# Patient Record
Sex: MALE | Race: WHITE | Employment: OTHER | URBAN - NONMETROPOLITAN AREA
[De-identification: names, ages, dates, MRNs, and addresses within clinical notes are randomized per-mention and may not be internally consistent; named-entity substitution may affect disease eponyms.]

---

## 2017-10-12 ENCOUNTER — HISTORY (OUTPATIENT)
Dept: EMERGENCY MEDICINE | Facility: OTHER | Age: 77
End: 2017-10-12

## 2017-10-13 ENCOUNTER — COMMUNICATION - GICH (OUTPATIENT)
Dept: FAMILY MEDICINE | Facility: OTHER | Age: 77
End: 2017-10-13

## 2017-10-13 ENCOUNTER — COMMUNICATION - GICH (OUTPATIENT)
Dept: OTHER | Facility: OTHER | Age: 77
End: 2017-10-13

## 2017-10-13 DIAGNOSIS — R53.1 WEAKNESS: ICD-10-CM

## 2017-12-28 NOTE — TELEPHONE ENCOUNTER
Patient Information     Patient Name MRN Sanchez Dave [id number] Male 1940      Telephone Encounter by Darlyn Ceballos at 10/13/2017 10:26 AM     Author:  Darlyn Ceballos Service:  (none) Author Type:  (none)     Filed:  10/13/2017 10:30 AM Encounter Date:  10/13/2017 Status:  Signed     :  Darlyn Ceballos            Needs orders clarified on the Klonopin   1-2 BID PRN. The hard copy says 2 tabs BID   They need to know which direction to follow.  IF it gets changed they will need a new hard copy and that would need to be faxed to Goranitdaniel Ceballos LPN ..........10/13/2017 10:29 AM

## 2017-12-28 NOTE — TELEPHONE ENCOUNTER
Patient Information     Patient Name MRN Sex Sanchez Duff [id number] Male 1940      Telephone Encounter by Jess Hickman MD at 10/13/2017 12:27 PM     Author:  Jess Hickman MD Service:  (none) Author Type:  Physician     Filed:  10/13/2017 12:28 PM Encounter Date:  10/13/2017 Status:  Signed     :  Jess Hickman MD (Physician)            I'm not familiar with this patient. It looks like previously it was given as needed. And yesterday the ED doc sent in an prescription for scheduled? Is this correct? What does NH staff think. Is keeping it as needed most appropriate?

## 2017-12-28 NOTE — TELEPHONE ENCOUNTER
Patient Information     Patient Name MRN Sanchez Dave [id number] Male 1940      Telephone Encounter by Mimi Feliz at 10/13/2017 10:16 AM     Author:  Mimi Feliz Service:  (none) Author Type:  (none)     Filed:  10/13/2017 10:17 AM Encounter Date:  10/13/2017 Status:  Signed     :  Mimi Feliz            Left message to call back.   Mimi Feliz LPN.................. 10/13/2017 10:16 AM

## 2017-12-28 NOTE — TELEPHONE ENCOUNTER
Patient Information     Patient Name MRN Sanchez Dave [id number] Male 1940      Telephone Encounter by Mimi Feliz at 10/13/2017  1:16 PM     Author:  Mimi Feliz Service:  (none) Author Type:  (none)     Filed:  10/13/2017  1:20 PM Encounter Date:  10/13/2017 Status:  Signed     :  Mimi Feliz            Called Meagan back. She states she found documentation stating that they called yesterday to have it clarified. She states it is as need. She no longer needs anything.    .sgin

## 2017-12-28 NOTE — TELEPHONE ENCOUNTER
Patient Information     Patient Name MRN Sex Sanchez Duff [id number] Male 1940      Telephone Encounter by Darlyn Ceballos at 10/13/2017 10:28 AM     Author:  Darlyn Ceballos Service:  (none) Author Type:  (none)     Filed:  10/13/2017 10:28 AM Encounter Date:  10/13/2017 Status:  Signed     :  Darlyn Ceballos            [facility] notified.   Darlyn Ceballos LPN ..........10/13/2017 10:28 AM

## 2017-12-28 NOTE — TELEPHONE ENCOUNTER
Patient Information     Patient Name MRN Sanchez Dave [id number] Male 1940      Telephone Encounter by Mimi Feliz at 10/13/2017 10:15 AM     Author:  Mimi Feliz Service:  (none) Author Type:  (none)     Filed:  10/13/2017 10:16 AM Encounter Date:  10/13/2017 Status:  Signed     :  Mimi Feliz            Left message to call back, see other phone note as well.     Mimi Feliz ....................  10/13/2017   10:16 AM

## 2017-12-28 NOTE — TELEPHONE ENCOUNTER
Patient Information     Patient Name MRN Sex Sanchez Duff [id number] Male 1940      Telephone Encounter by Bienvenido Moses MD at 10/13/2017  6:37 AM     Author:  Bienvenido Moses MD Service:  (none) Author Type:  Physician     Filed:  10/13/2017  6:38 AM Encounter Date:  10/13/2017 Status:  Signed     :  Bienvenido Moses MD (Physician)            Procalcitonin is negative. Unlikely to have bacterial infection causing elevated WBC. Other source possible. Has hx of leukocytosis [facility], which are not available. Can work with PCP on further eval for this.   Please let [facility] know.

## 2018-01-31 ENCOUNTER — DOCUMENTATION ONLY (OUTPATIENT)
Dept: FAMILY MEDICINE | Facility: OTHER | Age: 78
End: 2018-01-31

## 2018-01-31 RX ORDER — NEOMYCIN SULFATE, POLYMYXIN B SULFATE AND DEXAMETHASONE 3.5; 10000; 1 MG/ML; [USP'U]/ML; MG/ML
SUSPENSION/ DROPS OPHTHALMIC
COMMUNITY
Start: 2017-07-07 | End: 2018-11-07

## 2018-01-31 RX ORDER — CLONAZEPAM 1 MG/1
2 TABLET ORAL 2 TIMES DAILY
COMMUNITY
Start: 2017-10-12 | End: 2018-11-07

## 2018-01-31 RX ORDER — RISPERIDONE 0.25 MG/1
0.25 TABLET ORAL
COMMUNITY
Start: 2017-08-08 | End: 2018-11-07 | Stop reason: DRUGHIGH

## 2018-01-31 RX ORDER — FLUVOXAMINE MALEATE 100 MG
1 TABLET ORAL 2 TIMES DAILY
COMMUNITY
Start: 2016-08-18 | End: 2019-02-21

## 2018-01-31 RX ORDER — BUPROPION HYDROCHLORIDE 150 MG/1
1 TABLET ORAL DAILY
COMMUNITY
Start: 2017-09-15 | End: 2019-02-21

## 2018-01-31 RX ORDER — PREGABALIN 100 MG/1
1 CAPSULE ORAL 3 TIMES DAILY
COMMUNITY
Start: 2017-10-12 | End: 2018-11-07

## 2018-01-31 RX ORDER — PANTOPRAZOLE SODIUM 40 MG/1
40 TABLET, DELAYED RELEASE ORAL
COMMUNITY
Start: 2017-01-04 | End: 2019-01-01

## 2018-01-31 RX ORDER — GABAPENTIN 300 MG/1
2 CAPSULE ORAL AT BEDTIME
COMMUNITY
Start: 2017-07-05 | End: 2018-11-07

## 2018-01-31 RX ORDER — CELECOXIB 100 MG/1
1 CAPSULE ORAL
COMMUNITY
Start: 2017-09-01 | End: 2019-01-01

## 2018-11-07 ENCOUNTER — NURSING HOME VISIT (OUTPATIENT)
Dept: GERIATRICS | Facility: OTHER | Age: 78
End: 2018-11-07
Attending: NURSE PRACTITIONER
Payer: COMMERCIAL

## 2018-11-07 VITALS
RESPIRATION RATE: 16 BRPM | WEIGHT: 190.3 LBS | TEMPERATURE: 98.3 F | OXYGEN SATURATION: 94 % | HEART RATE: 66 BPM | SYSTOLIC BLOOD PRESSURE: 101 MMHG | DIASTOLIC BLOOD PRESSURE: 65 MMHG

## 2018-11-07 DIAGNOSIS — K22.719 BARRETT'S ESOPHAGUS WITH DYSPLASIA: ICD-10-CM

## 2018-11-07 DIAGNOSIS — R29.898 WEAKNESS OF BOTH LOWER EXTREMITIES: ICD-10-CM

## 2018-11-07 DIAGNOSIS — F41.1 ANXIETY, GENERALIZED: ICD-10-CM

## 2018-11-07 DIAGNOSIS — M15.9 OSTEOARTHRITIS OF MULTIPLE JOINTS, UNSPECIFIED OSTEOARTHRITIS TYPE: ICD-10-CM

## 2018-11-07 DIAGNOSIS — M47.816 ARTHROPATHY OF LUMBAR FACET JOINT: ICD-10-CM

## 2018-11-07 DIAGNOSIS — Z79.891 LONG TERM CURRENT USE OF OPIATE ANALGESIC: ICD-10-CM

## 2018-11-07 DIAGNOSIS — F42.9 OBSESSIVE-COMPULSIVE DISORDER, UNSPECIFIED TYPE: Primary | ICD-10-CM

## 2018-11-07 DIAGNOSIS — F33.41 RECURRENT MAJOR DEPRESSIVE DISORDER, IN PARTIAL REMISSION (H): ICD-10-CM

## 2018-11-07 DIAGNOSIS — J84.112 IPF (IDIOPATHIC PULMONARY FIBROSIS) (H): ICD-10-CM

## 2018-11-07 DIAGNOSIS — I71.40 ABDOMINAL AORTIC ANEURYSM (AAA) WITHOUT RUPTURE (H): ICD-10-CM

## 2018-11-07 DIAGNOSIS — M47.817 OSTEOARTHRITIS OF LUMBOSACRAL SPINE WITHOUT MYELOPATHY: ICD-10-CM

## 2018-11-07 RX ORDER — CLONAZEPAM 0.5 MG/1
0.5 TABLET ORAL 2 TIMES DAILY PRN
Qty: 20 TABLET | Refills: 0 | COMMUNITY
Start: 2018-11-07 | End: 2019-01-01

## 2018-11-07 RX ORDER — POLYETHYLENE GLYCOL 3350 17 G/17G
1 POWDER, FOR SOLUTION ORAL DAILY PRN
Qty: 510 G | Refills: 1 | COMMUNITY
Start: 2018-11-07 | End: 2019-02-21

## 2018-11-07 RX ORDER — HYDROCODONE BITARTRATE AND ACETAMINOPHEN 5; 325 MG/1; MG/1
1 TABLET ORAL AT BEDTIME
Refills: 0 | COMMUNITY
Start: 2018-11-07 | End: 2018-11-27

## 2018-11-07 RX ORDER — LACTOBACILLUS ACIDOPHILUS 500MM CELL
1 CAPSULE ORAL DAILY
COMMUNITY
Start: 2018-11-07 | End: 2019-05-03

## 2018-11-07 RX ORDER — GABAPENTIN 300 MG/1
300 CAPSULE ORAL 3 TIMES DAILY
Qty: 180 CAPSULE | Refills: 5 | COMMUNITY
Start: 2018-11-07 | End: 2019-02-21
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
## 2018-11-07 NOTE — MR AVS SNAPSHOT
After Visit Summary   11/7/2018    Sanchez Hensley    MRN: [id number]           Patient Information     Date Of Birth          1940        Visit Information        Provider Department      11/7/2018 3:30 AM Antonietta Pérez NP [facility]        Today's Diagnoses     Obsessive-compulsive disorder, unspecified type    -  1    Anxiety, generalized        Abdominal aortic aneurysm (AAA) without rupture (H)        Us's esophagus with dysplasia        Arthropathy of lumbar facet joint        Osteoarthritis of lumbosacral spine without myelopathy        Weakness of both lower extremities        Long term current use of opiate analgesic        Osteoarthritis of multiple joints, unspecified osteoarthritis type        Recurrent major depressive disorder, in partial remission (H)        IPF (idiopathic pulmonary fibrosis) (H)           Follow-ups after your visit        Who to contact     If you have questions or need follow up information about today's clinic visit or your schedule please contact [facility] directly at [phone].  Normal or non-critical lab and imaging results will be communicated to you by MyChart, letter or phone within 4 business days after the clinic has received the results. If you do not hear from us within 7 days, please contact the clinic through MyChart or phone. If you have a critical or abnormal lab result, we will notify you by phone as soon as possible.  Submit refill requests through [employer] or call your pharmacy and they will forward the refill request to us. Please allow 3 business days for your refill to be completed.          Additional Information About Your Visit        Care EveryWhere ID     This is your Care EveryWhere ID. This could be used by other organizations to access your [facility] medical records  [id number]        Your Vitals Were     Pulse Temperature Respirations Pulse Oximetry          66 98.3  F (36.8   C) 16 94%         Blood Pressure from Last 3 Encounters:   11/07/18 101/65    Weight from Last 3 Encounters:   11/07/18 190 lb 4.8 oz (86.3 kg)              Today, you had the following     No orders found for display         Today's Medication Changes          These changes are accurate as of 11/7/18  6:45 PM.  If you have any questions, ask your nurse or doctor.               These medicines have changed or have updated prescriptions.        Dose/Directions    clonazePAM 0.5 MG tablet   Commonly known as:  klonoPIN   This may have changed:    - medication strength  - how much to take  - when to take this  - reasons to take this   Used for:  Anxiety, generalized   Changed by:  Antonietta Pérez NP        Dose:  0.5 mg   Take 1 tablet (0.5 mg) by mouth 2 times daily as needed for anxiety   Quantity:  20 tablet   Refills:  0       gabapentin 300 MG capsule   Commonly known as:  NEURONTIN   This may have changed:    - how much to take  - when to take this   Changed by:  Antonietta Pérez NP        Dose:  300 mg   Take 1 capsule (300 mg) by mouth 3 times daily   Quantity:  180 capsule   Refills:  5         Stop taking these medicines if you haven't already. Please contact your care team if you have questions.     risperiDONE 0.25 MG tablet   Commonly known as:  risperDAL   Stopped by:  Antonietta Pérez NP                   Information about OPIOIDS     PRESCRIPTION OPIOIDS: WHAT YOU NEED TO KNOW   We gave you an opioid (narcotic) pain medicine. It is important to manage your pain, but opioids are not always the best choice. You should first try all the other options your care team gave you. Take this medicine for as short a time (and as few doses) as possible.    Some activities can increase your pain, such as bandage changes or therapy sessions. It may help to take your pain medicine 30 to 60 minutes before these activities. Reduce your stress by getting enough sleep, working on hobbies you enjoy and practicing relaxation or  meditation. Talk to your care team about ways to manage your pain beyond prescription opioids.    These medicines have risks:    DO NOT drive when on new or higher doses of pain medicine. These medicines can affect your alertness and reaction times, and you could be arrested for driving under the influence (DUI). If you need to use opioids long-term, talk to your care team about driving.    DO NOT operate heavy machinery    DO NOT do any other dangerous activities while taking these medicines.    DO NOT drink any alcohol while taking these medicines.     If the opioid prescribed includes acetaminophen, DO NOT take with any other medicines that contain acetaminophen. Read all labels carefully. Look for the word  acetaminophen  or  Tylenol.  Ask your pharmacist if you have questions or are unsure.    You can get addicted to pain medicines, especially if you have a history of addiction (chemical, alcohol or substance dependence). Talk to your care team about ways to reduce this risk.    All opioids tend to cause constipation. Drink plenty of water and eat foods that have a lot of fiber, such as fruits, vegetables, prune juice, apple juice and high-fiber cereal. Take a laxative (Miralax, milk of magnesia, Colace, Senna) if you don t move your bowels at least every other day. Other side effects include upset stomach, sleepiness, dizziness, throwing up, tolerance (needing more of the medicine to have the same effect), physical dependence and slowed breathing.    Store your pills in a secure place, locked if possible. We will not replace any lost or stolen medicine. If you don t finish your medicine, please throw away (dispose) as directed by your pharmacist. The [place] Pollution [employer] has more information about safe disposal: https://[email or web address].[facility].[place].us/living-green/managing-unwanted-medications         Primary Care Provider Office Phone # Fax #    Antonietta Pérez [phone]       [address]  [facility] [address]        Equal Access to Services     MYCHAL SEGOVIA : Hadii wendy Trotter, jorge bryant, pato dash. So [facility] [phone].    ATENCIÓN: Si habla español, tiene a dobbs disposición servicios gratuitos de asistencia lingüística. Llame al [phone].    We comply with applicable federal civil rights laws and [place] laws. We do not discriminate on the basis of race, color, national origin, age, disability, sex, sexual orientation, or gender identity.            Thank you!     Thank you for choosing [facility]  for your care. Our goal is always to provide you with excellent care. Hearing back from our patients is one way we can continue to improve our services. Please take a few minutes to complete the written survey that you may receive in the mail after your visit with us. Thank you!             Your Updated Medication List - Protect others around you: Learn how to safely use, store and throw away your medicines at www.disposemymeds.org.          This list is accurate as of 11/7/18  6:45 PM.  Always use your most recent med list.                   Brand Name Dispense Instructions for use Diagnosis    Acidophilus Lactobacillus Caps      Take 1 capsule by mouth daily        buPROPion 150 MG 24 hr tablet    WELLBUTRIN XL     Take 1 tablet by mouth daily Do not chew or crush        celecoxib 100 MG capsule    celeBREX     Take 1 capsule by mouth 2 times daily as needed for pain        clonazePAM 0.5 MG tablet    klonoPIN    20 tablet    Take 1 tablet (0.5 mg) by mouth 2 times daily as needed for anxiety    Anxiety, generalized       fluvoxaMINE 100 MG tablet    LUVOX     Take 1 tablet by mouth 2 times daily        gabapentin 300 MG capsule    NEURONTIN    180 capsule    Take 1 capsule (300 mg) by mouth 3 times daily        HYDROcodone-acetaminophen 5-325 MG per tablet    NORCO     Take 1 tablet by  mouth At Bedtime MAY ALSO TAKE ONE TAB EVERY 8 HOURS PRN PAIN.        MIRALAX powder   Generic drug:  polyethylene glycol     510 g    Take 17 g (1 capful) by mouth daily as needed for constipation        pantoprazole 40 MG EC tablet    PROTONIX     Take 40 mg by mouth

## 2018-11-14 ENCOUNTER — MEDICAL CORRESPONDENCE (OUTPATIENT)
Dept: LAB | Facility: OTHER | Age: 78
End: 2018-11-14

## 2018-11-14 ENCOUNTER — RESULTS ONLY (OUTPATIENT)
Dept: LAB | Age: 78
End: 2018-11-14

## 2018-11-14 ENCOUNTER — NURSING HOME VISIT (OUTPATIENT)
Dept: GERIATRICS | Facility: OTHER | Age: 78
End: 2018-11-14
Attending: NURSE PRACTITIONER
Payer: MEDICARE

## 2018-11-14 VITALS
OXYGEN SATURATION: 95 % | RESPIRATION RATE: 20 BRPM | DIASTOLIC BLOOD PRESSURE: 95 MMHG | HEART RATE: 78 BPM | SYSTOLIC BLOOD PRESSURE: 155 MMHG | TEMPERATURE: 97.7 F

## 2018-11-14 DIAGNOSIS — J84.112 IPF (IDIOPATHIC PULMONARY FIBROSIS) (H): ICD-10-CM

## 2018-11-14 DIAGNOSIS — F33.41 RECURRENT MAJOR DEPRESSIVE DISORDER, IN PARTIAL REMISSION (H): ICD-10-CM

## 2018-11-14 DIAGNOSIS — F42.9 OBSESSIVE-COMPULSIVE DISORDER, UNSPECIFIED TYPE: ICD-10-CM

## 2018-11-14 DIAGNOSIS — F41.1 ANXIETY, GENERALIZED: ICD-10-CM

## 2018-11-14 DIAGNOSIS — L84 CALLUS OF FOOT: Primary | ICD-10-CM

## 2018-11-14 LAB
ALBUMIN SERPL-MCNC: 3.5 G/DL (ref 3.5–5.7)
ALP SERPL-CCNC: 105 U/L (ref 34–104)
ALT SERPL W P-5'-P-CCNC: 15 U/L (ref 7–52)
ANION GAP SERPL CALCULATED.3IONS-SCNC: 8 MMOL/L (ref 3–14)
AST SERPL W P-5'-P-CCNC: 23 U/L (ref 13–39)
BILIRUB SERPL-MCNC: 0.6 MG/DL (ref 0.3–1)
BUN SERPL-MCNC: 26 MG/DL (ref 7–25)
CALCIUM SERPL-MCNC: 9.4 MG/DL (ref 8.6–10.3)
CHLORIDE SERPL-SCNC: 106 MMOL/L (ref 98–107)
CHOLEST SERPL-MCNC: 151 MG/DL
CO2 SERPL-SCNC: 25 MMOL/L (ref 21–31)
CREAT SERPL-MCNC: 1.47 MG/DL (ref 0.7–1.3)
ERYTHROCYTE [DISTWIDTH] IN BLOOD BY AUTOMATED COUNT: 15.5 % (ref 10–15)
GFR SERPL CREATININE-BSD FRML MDRD: 46 ML/MIN/1.7M2
GLUCOSE SERPL-MCNC: 136 MG/DL (ref 70–105)
HCT VFR BLD AUTO: 44.1 % (ref 40–53)
HDLC SERPL-MCNC: 21 MG/DL (ref 23–92)
HGB BLD-MCNC: 14.4 G/DL (ref 13.3–17.7)
LDLC SERPL CALC-MCNC: 96 MG/DL
MCH RBC QN AUTO: 31.1 PG (ref 26.5–33)
MCHC RBC AUTO-ENTMCNC: 32.7 G/DL (ref 31.5–36.5)
MCV RBC AUTO: 95 FL (ref 78–100)
NONHDLC SERPL-MCNC: 130 MG/DL
PLATELET # BLD AUTO: 674 10E9/L (ref 150–450)
POTASSIUM SERPL-SCNC: 5 MMOL/L (ref 3.5–5.1)
PROT SERPL-MCNC: 6.7 G/DL (ref 6.4–8.9)
RBC # BLD AUTO: 4.63 10E12/L (ref 4.4–5.9)
SODIUM SERPL-SCNC: 139 MMOL/L (ref 134–144)
TRIGL SERPL-MCNC: 170 MG/DL
WBC # BLD AUTO: 15.6 10E9/L (ref 4–11)

## 2018-11-14 NOTE — PROGRESS NOTES
"[facility]    Sanchez Hensley  : 1940  MRN: [id number]  Primary Care Physician: Antonietta Pérez CNP  [facility].     2018     HPI: Sanchez Hensley is a 78 year old male being seen today at [facility] for follow up of mood; right toe callus, and discussion for pulmonary referral due to pulmonary fibrosis.     Mood: history significant for anxiety, recurrent major depression, and obsessive-compulsive disorder. One week ago, his risperadal was stopped, gabapentin changed from at bedtime to TID. Continued with no change in dose of luvox, bupropion and prn klonopin. Staff note no change in mood or behaviors since risperdal discontinued.     Callus right 4th toe: he states the mole skin is helping.    Pulmonary fibrosis: as seen on chest CT on 10/12/2017. Does report dyspnea with exertion. Denies cough, no shortness of breath at rest.     He feels well today and his only complaint is his chronic back pain which he has had to live with \"for over 50 years.\" He does have prn norco but has not taken any other than a nightly dose before bed.   He denies any acute symptoms, no fever, no chills. No change in appetite, no bowel or bladder issues.     Medication reconciliation: completed.     CODE STATUS: DNR/DNI    Patient Active Problem List    Diagnosis Date Noted     IPF (idiopathic pulmonary fibrosis) (H) 2018     Priority: Medium     Leukocytosis 2016     Priority: Medium     Abdominal aortic aneurysm (AAA) without rupture (H) 2016     Priority: Medium     Long term current use of opiate analgesic 2015     Priority: Medium     Overview:   Overview:   [facility] Treatment Agreement: Controlled Substances for Pain Management 2015  [facility] Agreement for Opioid Treatment  PHYSICIAN:  Bob Alvarado MD  PHARMACY:  [facility] in [place], [place]  PHARMACY PHONE:  [phone]    7-8 injections with Dr. Abdullahi Pemberton over a " period of 3 years, with very limited effect.        Hearing loss of right ear 03/30/2015     Priority: Medium     Difficulty sleeping 10/02/2014     Priority: Medium     Weakness of lower extremity 10/02/2014     Priority: Medium     HCD (health care directive) 08/27/2014     Priority: Medium     Gastroesophageal reflux disease 01/23/2014     Priority: Medium     Anxiety, generalized 09/23/2013     Priority: Medium     Overview:   Overview:   GAD7 - 3/27/14 (3)       Major depressive disorder 09/23/2013     Priority: Medium     Overview:   Overview:   PHQ9 - 11/6/14 (0)  PHQ9 - 10/2/14 (6)  PHQ9 - 8/21/14 (2)       Arthropathy of lumbar facet joint 03/26/2013     Priority: Medium     Blood glucose abnormal 03/26/2013     Priority: Medium     Osteoarthritis of lumbosacral spine without myelopathy 03/26/2013     Priority: Medium     Overview:   Overview:   neurolysis, injections, no help. Has seen Dr. 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     Priority: Medium     Osteoarthritis 08/08/2006     Priority: Medium     Overview:   Overview:   IMO Update  IMO Update 10 2016       Current Outpatient Prescriptions   Medication Sig Dispense Refill     Acidophilus Lactobacillus CAPS Take 1 capsule by mouth daily       buPROPion (WELLBUTRIN XL) 150 MG 24 hr tablet Take 1 tablet by mouth daily Do not chew or crush       celecoxib (CELEBREX) 100 MG capsule Take 1 capsule by mouth 2 times daily as needed for pain       clonazePAM (KLONOPIN) 0.5 MG tablet Take 1 tablet (0.5 mg) by mouth 2 times daily as needed for anxiety 20 tablet 0     fluvoxaMINE (LUVOX) 100 MG tablet Take 1 tablet by mouth 2 times daily       gabapentin (NEURONTIN) 300 MG capsule Take 1  capsule (300 mg) by mouth 3 times daily 180 capsule 5     HYDROcodone-acetaminophen (NORCO) 5-325 MG per tablet Take 1 tablet by mouth At Bedtime MAY ALSO TAKE ONE TAB EVERY 8 HOURS PRN PAIN.  0     pantoprazole (PROTONIX) 40 MG EC tablet Take 40 mg by mouth       polyethylene glycol (MIRALAX) powder Take 17 g (1 capful) by mouth daily as needed for constipation 510 g 1     Allergies   Allergen Reactions     Aspirin Hives     Nasal congestion     Morphine      Other reaction(s): *Unknown  Nausea, dizzy, sick to stomach     Naproxen      Other reaction(s): *Unknown  Bothered stomach     Sulfasalazine      Other reaction(s): *Unknown       Review of systems:  Pertinent systems reviewed and found to be negative. No chest pain, no fever, no new behavior disturbances.       PHYSICAL EXAM:  Vitals per Nursing staff: Blood pressure (!) 155/95, pulse 78, temperature 97.7  F (36.5  C), resp. rate 20, SpO2 95 %.RA  GENERAL APPEARANCE: Well developed elderly, white male in no acute distress. Alert, oriented x 3, some forgetfulness.  SKIN: Warm and dry. No change to callus to right 4th distal MTP joint. Wearing slippers. No mole skin covering callus at this time.   LUNGS: Normal respirations, non labored, fine velcro crackles to bilateral bases, likely chronic due to pulmonary fibrosis. No wheezes. No cough.  HEART: Regular rhythm and rate; S1 and S2, no murmur, gallop or rub  EXTREMITIES: No bilateral lower extremity edema. No joint redness or swelling.   PSYCHIATRIC: Very pleasant, talkative, mood and affect appropriate.     Labs: drawn today with results pending: cmp, lipid, cbc with platelets       ASSESSMENT / PLAN:  (L84) Callus of foot  (primary encounter diagnosis)  Plan: continue mole skin for protection and comfort.     (J84.112) IPF (idiopathic pulmonary fibrosis) (H)  Comment: we did discuss findings on chest CT of pulmonary fibrosis and further testing for monitoring with PFT and referral to pulmonology.   Plan:  Rojas defers PFT and referral to specialty at this time and states he will consider for future.     (F41.1) Anxiety, generalized  (F33.41) Recurrent major depressive disorder, in partial remission (H)  (F42.9) Obsessive-compulsive disorder, unspecified type  Comment:   Plan: no new behaviors or mood disturbances since risperdal was discontinue one week ago.     Late entry: today's labs show leukocytosis with white blood cell count of 15.6. Exam findings not suspicious for acute infection, patient denies feeling ill, no new c/o pain. Will have lab follow up with microscopic slide review, further studies if indicated.   There are limited lab study results in [facility] EMR. He did have an elevated white blood cell count of 23.3 in 10/12/2017 and further studies and diagnostics negative for acute infection at that time.      Antonietta Pérez, CNP

## 2018-11-14 NOTE — MR AVS SNAPSHOT
After Visit Summary   11/14/2018    Sanchez Hensley    MRN: [id number]           Patient Information     Date Of Birth          1940        Visit Information        Provider Department      11/14/2018 3:00 AM Antonietta Pérez NP [facility]        Today's Diagnoses     Callus of foot    -  1    IPF (idiopathic pulmonary fibrosis) (H)        Anxiety, generalized        Recurrent major depressive disorder, in partial remission (H)        Obsessive-compulsive disorder, unspecified type           Follow-ups after your visit        Who to contact     If you have questions or need follow up information about today's clinic visit or your schedule please contact [facility] directly at [phone].  Normal or non-critical lab and imaging results will be communicated to you by MyChart, letter or phone within 4 business days after the clinic has received the results. If you do not hear from us within 7 days, please contact the clinic through MyChart or phone. If you have a critical or abnormal lab result, we will notify you by phone as soon as possible.  Submit refill requests through [facility] or call your pharmacy and they will forward the refill request to us. Please allow 3 business days for your refill to be completed.          Additional Information About Your Visit        Care EveryWhere ID     This is your Care EveryWhere ID. This could be used by other organizations to access your [facility] medical records  [id number]        Your Vitals Were     Pulse Temperature Respirations Pulse Oximetry          78 97.7  F (36.5  C) 20 95%         Blood Pressure from Last 3 Encounters:   11/14/18 (!) 155/95   11/07/18 101/65    Weight from Last 3 Encounters:   11/07/18 190 lb 4.8 oz (86.3 kg)              Today, you had the following     No orders found for display       Primary Care Provider Office Phone # Fax #    Antonietta Pérez [phone]       [id number] [facility] [id number]        Equal Access to Services     SHASTAPATRIC JULIETTE : Hadii wendy Trotter, jorge bryant, pato adsh. So [facility] [phone].    ATENCIÓN: Si habla español, tiene a dobbs disposición servicios gratuitos de asistencia lingüística. Llame al [phone].    We comply with applicable federal civil rights laws and [place] laws. We do not discriminate on the basis of race, color, national origin, age, disability, sex, sexual orientation, or gender identity.            Thank you!     Thank you for choosing [facility]  for your care. Our goal is always to provide you with excellent care. Hearing back from our patients is one way we can continue to improve our services. Please take a few minutes to complete the written survey that you may receive in the mail after your visit with us. Thank you!             Your Updated Medication List - Protect others around you: Learn how to safely use, store and throw away your medicines at www.disposemymeds.org.          This list is accurate as of 11/14/18  3:56 PM.  Always use your most recent med list.                   Brand Name Dispense Instructions for use Diagnosis    Acidophilus Lactobacillus Caps      Take 1 capsule by mouth daily        buPROPion 150 MG 24 hr tablet    WELLBUTRIN XL     Take 1 tablet by mouth daily Do not chew or crush        celecoxib 100 MG capsule    celeBREX     Take 1 capsule by mouth 2 times daily as needed for pain        clonazePAM 0.5 MG tablet    klonoPIN    20 tablet    Take 1 tablet (0.5 mg) by mouth 2 times daily as needed for anxiety    Anxiety, generalized       fluvoxaMINE 100 MG tablet    LUVOX     Take 1 tablet by mouth 2 times daily        gabapentin 300 MG capsule    NEURONTIN    180 capsule    Take 1 capsule (300 mg) by mouth 3 times daily        HYDROcodone-acetaminophen 5-325 MG per tablet    NORCO     Take 1  tablet by mouth At Bedtime MAY ALSO TAKE ONE TAB EVERY 8 HOURS PRN PAIN.        MIRALAX powder   Generic drug:  polyethylene glycol     510 g    Take 17 g (1 capful) by mouth daily as needed for constipation        pantoprazole 40 MG EC tablet    PROTONIX     Take 40 mg by mouth

## 2018-11-27 DIAGNOSIS — G89.4 CHRONIC PAIN SYNDROME: Primary | ICD-10-CM

## 2018-11-27 RX ORDER — HYDROCODONE BITARTRATE AND ACETAMINOPHEN 5; 325 MG/1; MG/1
1 TABLET ORAL AT BEDTIME
Qty: 35 TABLET | Refills: 0 | Status: SHIPPED | OUTPATIENT
Start: 2018-11-27 | End: 2018-12-27

## 2018-12-26 DIAGNOSIS — G89.4 CHRONIC PAIN SYNDROME: ICD-10-CM

## 2018-12-26 NOTE — TELEPHONE ENCOUNTER
Routing refill request to provider for review/approval because:  Drug not on the [facility] refill protocol     Last filled on 11-27-18 for #35 X 0 refills. Last nursing home visit 11-14-18.  Ashlyn Clemens RN on 12/26/2018 at 1:34 PM

## 2018-12-27 RX ORDER — HYDROCODONE BITARTRATE AND ACETAMINOPHEN 5; 325 MG/1; MG/1
1 TABLET ORAL AT BEDTIME
Qty: 35 TABLET | Refills: 0 | Status: SHIPPED | OUTPATIENT
Start: 2018-12-27 | End: 2019-01-24

## 2018-12-27 NOTE — TELEPHONE ENCOUNTER
Notified [facility] prescription is ready to be picked up at the [address] [facility].    Ashlyn Mosher LPN............12/27/2018 3:54 PM

## 2019-01-01 ENCOUNTER — TELEPHONE (OUTPATIENT)
Dept: FAMILY MEDICINE | Facility: OTHER | Age: 79
End: 2019-01-01

## 2019-01-01 ENCOUNTER — HOSPITAL ENCOUNTER (OUTPATIENT)
Dept: GENERAL RADIOLOGY | Facility: OTHER | Age: 79
Discharge: HOME | End: 2019-11-26
Attending: FAMILY MEDICINE | Admitting: FAMILY MEDICINE
Payer: MEDICARE

## 2019-01-01 ENCOUNTER — NURSING HOME VISIT (OUTPATIENT)
Dept: GERIATRICS | Facility: OTHER | Age: 79
End: 2019-01-01
Attending: NURSE PRACTITIONER
Payer: COMMERCIAL

## 2019-01-01 ENCOUNTER — MEDICAL CORRESPONDENCE (OUTPATIENT)
Dept: HEALTH INFORMATION MANAGEMENT | Facility: OTHER | Age: 79
End: 2019-01-01

## 2019-01-01 ENCOUNTER — OFFICE VISIT (OUTPATIENT)
Dept: FAMILY MEDICINE | Facility: OTHER | Age: 79
End: 2019-01-01
Attending: FAMILY MEDICINE
Payer: COMMERCIAL

## 2019-01-01 ENCOUNTER — APPOINTMENT (OUTPATIENT)
Dept: LAB | Facility: OTHER | Age: 79
End: 2019-01-01
Attending: NURSE PRACTITIONER
Payer: MEDICARE

## 2019-01-01 ENCOUNTER — RESULTS ONLY (OUTPATIENT)
Dept: LAB | Age: 79
End: 2019-01-01

## 2019-01-01 VITALS
SYSTOLIC BLOOD PRESSURE: 104 MMHG | DIASTOLIC BLOOD PRESSURE: 68 MMHG | WEIGHT: 189.4 LBS | TEMPERATURE: 97.7 F | HEIGHT: 68 IN | OXYGEN SATURATION: 97 % | HEART RATE: 90 BPM | RESPIRATION RATE: 20 BRPM | BODY MASS INDEX: 28.7 KG/M2

## 2019-01-01 VITALS
SYSTOLIC BLOOD PRESSURE: 122 MMHG | TEMPERATURE: 98.9 F | RESPIRATION RATE: 18 BRPM | DIASTOLIC BLOOD PRESSURE: 70 MMHG | OXYGEN SATURATION: 93 % | HEART RATE: 77 BPM

## 2019-01-01 DIAGNOSIS — F42.9 OBSESSIVE-COMPULSIVE DISORDER, UNSPECIFIED TYPE: ICD-10-CM

## 2019-01-01 DIAGNOSIS — F33.41 RECURRENT MAJOR DEPRESSIVE DISORDER, IN PARTIAL REMISSION (H): ICD-10-CM

## 2019-01-01 DIAGNOSIS — J84.10 PULMONARY FIBROSIS (H): ICD-10-CM

## 2019-01-01 DIAGNOSIS — K59.09 OTHER CONSTIPATION: Primary | ICD-10-CM

## 2019-01-01 DIAGNOSIS — G89.4 CHRONIC PAIN DISORDER: Primary | ICD-10-CM

## 2019-01-01 DIAGNOSIS — H61.22 IMPACTED CERUMEN OF LEFT EAR: Primary | ICD-10-CM

## 2019-01-01 DIAGNOSIS — K59.09 OTHER CONSTIPATION: ICD-10-CM

## 2019-01-01 DIAGNOSIS — F41.1 ANXIETY, GENERALIZED: ICD-10-CM

## 2019-01-01 DIAGNOSIS — K22.719 BARRETT'S ESOPHAGUS WITH DYSPLASIA: ICD-10-CM

## 2019-01-01 LAB
CREAT SERPL-MCNC: 1.27 MG/DL (ref 0.7–1.3)
GFR SERPL CREATININE-BSD FRML MDRD: 54 ML/MIN/{1.73_M2}

## 2019-01-01 PROCEDURE — G0463 HOSPITAL OUTPT CLINIC VISIT: HCPCS | Mod: 25

## 2019-01-01 PROCEDURE — 71046 X-RAY EXAM CHEST 2 VIEWS: CPT

## 2019-01-01 PROCEDURE — 99215 OFFICE O/P EST HI 40 MIN: CPT | Performed by: FAMILY MEDICINE

## 2019-01-01 PROCEDURE — G0463 HOSPITAL OUTPT CLINIC VISIT: HCPCS

## 2019-01-01 RX ORDER — CELECOXIB 100 MG/1
100 CAPSULE ORAL
Qty: 90 CAPSULE | Refills: 3 | Status: SHIPPED | OUTPATIENT
Start: 2019-01-01 | End: 2020-01-01

## 2019-01-01 RX ORDER — AMOXICILLIN 250 MG
1 CAPSULE ORAL DAILY
OUTPATIENT
Start: 2019-01-01

## 2019-01-01 RX ORDER — BISACODYL 5 MG/1
5 TABLET, DELAYED RELEASE ORAL DAILY PRN
Qty: 30 TABLET | Refills: 1 | Status: SHIPPED | OUTPATIENT
Start: 2019-01-01 | End: 2019-01-01

## 2019-01-01 RX ORDER — BISACODYL 5 MG/1
5 TABLET, DELAYED RELEASE ORAL DAILY PRN
Qty: 90 TABLET | Refills: 1 | Status: SHIPPED | OUTPATIENT
Start: 2019-01-01

## 2019-01-01 RX ORDER — DULOXETIN HYDROCHLORIDE 60 MG/1
120 CAPSULE, DELAYED RELEASE ORAL DAILY
Qty: 180 CAPSULE | Refills: 3 | Status: SHIPPED | OUTPATIENT
Start: 2019-01-01 | End: 2020-01-01

## 2019-01-01 RX ORDER — SENNA AND DOCUSATE SODIUM 50; 8.6 MG/1; MG/1
2 TABLET, FILM COATED ORAL AT BEDTIME
Qty: 60 TABLET | Refills: 0 | Status: SHIPPED | OUTPATIENT
Start: 2019-01-01 | End: 2019-01-01 | Stop reason: ALTCHOICE

## 2019-01-01 RX ORDER — PREGABALIN 150 MG/1
150 CAPSULE ORAL 2 TIMES DAILY
Qty: 180 CAPSULE | Refills: 1 | Status: SHIPPED | OUTPATIENT
Start: 2019-01-01 | End: 2020-01-01

## 2019-01-01 RX ORDER — PREGABALIN 150 MG/1
150 CAPSULE ORAL 2 TIMES DAILY
COMMUNITY
Start: 2019-01-01 | End: 2019-01-01

## 2019-01-01 RX ORDER — CLONAZEPAM 0.5 MG/1
0.5 TABLET ORAL 2 TIMES DAILY PRN
Qty: 20 TABLET | Refills: 0 | Status: SHIPPED | OUTPATIENT
Start: 2019-01-01

## 2019-01-01 RX ORDER — PANTOPRAZOLE SODIUM 40 MG/1
40 TABLET, DELAYED RELEASE ORAL DAILY
Qty: 90 TABLET | Refills: 3 | Status: SHIPPED | OUTPATIENT
Start: 2019-01-01

## 2019-01-01 ASSESSMENT — PAIN SCALES - GENERAL: PAINLEVEL: SEVERE PAIN (7)

## 2019-01-01 ASSESSMENT — PATIENT HEALTH QUESTIONNAIRE - PHQ9: SUM OF ALL RESPONSES TO PHQ QUESTIONS 1-9: 5

## 2019-01-01 ASSESSMENT — MIFFLIN-ST. JEOR: SCORE: 1537.18

## 2019-01-16 ENCOUNTER — NURSING HOME VISIT (OUTPATIENT)
Dept: GERIATRICS | Facility: OTHER | Age: 79
End: 2019-01-16
Attending: NURSE PRACTITIONER
Payer: COMMERCIAL

## 2019-01-16 VITALS
TEMPERATURE: 98.5 F | SYSTOLIC BLOOD PRESSURE: 152 MMHG | DIASTOLIC BLOOD PRESSURE: 83 MMHG | RESPIRATION RATE: 18 BRPM | OXYGEN SATURATION: 97 % | HEART RATE: 67 BPM

## 2019-01-16 DIAGNOSIS — R42 DIZZINESS: Primary | ICD-10-CM

## 2019-01-16 DIAGNOSIS — Z91.81 HISTORY OF RECENT FALL: ICD-10-CM

## 2019-01-16 NOTE — PROGRESS NOTES
"[facility] CARE    Sanchez Hensley  : 1940  MRN: [id number]  Primary Care Physician: Antonietta Pérez [facility].     2019    HPI: Sanchez Hensley is a 78 year old male being seen today at [facility] for c/o dizziness. He did have a fall last week after losing his balance and fell to his knees, no injury. He does not recall if he was dizzy causing him to lose his balance.      Rojas appears his usual today, he is sitting in his recliner. He states he feels \"fine\" and denies any dizziness at present. He does report experiencing transient dizziness for several years. He does not feel it is getting worse. He denies associated symptoms of lightheadedness, tinnitus, vision changes, weakness or near syncope. He denies vertigo.      Medication reconciliation: completed.     CODE STATUS: DNR/DNI    Patient Active Problem List    Diagnosis Date Noted     IPF (idiopathic pulmonary fibrosis) (H) 2018     Priority: Medium     Leukocytosis 2016     Priority: Medium     Abdominal aortic aneurysm (AAA) without rupture (H) 2016     Priority: Medium     Long term current use of opiate analgesic 2015     Priority: Medium     Overview:   Overview:   [facility] Treatment Agreement: Controlled Substances for Pain Management 2015  [facility] Agreement for Opioid Treatment  PHYSICIAN:  Bob Alvarado MD  PHARMACY:  [facility] in [place], [place]  PHARMACY PHONE:  [phone]    7-8 injections with Dr. Abdullahi Pemberton over a period of 3 years, with very limited effect.        Hearing loss of right ear 2015     Priority: Medium     Difficulty sleeping 10/02/2014     Priority: Medium     Weakness of lower extremity 10/02/2014     Priority: Medium     HCD (health care directive) 2014     Priority: Medium     Gastroesophageal reflux disease 2014     Priority: Medium     Anxiety, generalized 2013     Priority: Medium     Overview: "   Overview:   GAD7 - 3/27/14 (3)       Major depressive disorder 09/23/2013     Priority: Medium     Overview:   Overview:   PHQ9 - 11/6/14 (0)  PHQ9 - 10/2/14 (6)  PHQ9 - 8/21/14 (2)       Arthropathy of lumbar facet joint 03/26/2013     Priority: Medium     Blood glucose abnormal 03/26/2013     Priority: Medium     Osteoarthritis of lumbosacral spine without myelopathy 03/26/2013     Priority: Medium     Overview:   Overview:   neurolysis, injections, no help. Has seen Dr. 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  Social History     Socioeconomic History     Marital status:      Spouse name: Not on file     Number of children: Not on file     Years of education: Not on file     Highest education level: Not on file   Social Needs     Financial resource strain: Not on file     Food insecurity - worry: Not on file     Food insecurity - inability: Not on file     Transportation needs - medical: Not on file     Transportation needs - non-medical: Not on file   Occupational History     Not on file   Tobacco Use     Smoking status: Never Smoker     Smokeless tobacco: Never Used   Substance and Sexual Activity     Alcohol use: No     Drug use: Unknown     Types: Other     Comment: Drug use: No     Sexual activity: No   Other Topics Concern     Not on file   Social History Narrative     Not on file     No family history on file.  Current Outpatient Medications   Medication Sig Dispense Refill     Acidophilus Lactobacillus CAPS Take 1 capsule by mouth daily       buPROPion  "(WELLBUTRIN XL) 150 MG 24 hr tablet Take 1 tablet by mouth daily Do not chew or crush       celecoxib (CELEBREX) 100 MG capsule Take 1 capsule by mouth 2 times daily as needed for pain       clonazePAM (KLONOPIN) 0.5 MG tablet Take 1 tablet (0.5 mg) by mouth 2 times daily as needed for anxiety 20 tablet 0     fluvoxaMINE (LUVOX) 100 MG tablet Take 1 tablet by mouth 2 times daily       gabapentin (NEURONTIN) 300 MG capsule Take 1 capsule (300 mg) by mouth 3 times daily 180 capsule 5     HYDROcodone-acetaminophen (NORCO) 5-325 MG tablet Take 1 tablet by mouth At Bedtime MAY ALSO TAKE ONE TAB EVERY 8 HOURS PRN PAIN. 35 tablet 0     pantoprazole (PROTONIX) 40 MG EC tablet Take 40 mg by mouth       polyethylene glycol (MIRALAX) powder Take 17 g (1 capful) by mouth daily as needed for constipation 510 g 1     Allergies   Allergen Reactions     Aspirin Hives     Nasal congestion     Morphine      Other reaction(s): *Unknown  Nausea, dizzy, sick to stomach     Naproxen      Other reaction(s): *Unknown  Bothered stomach     Sulfasalazine      Other reaction(s): *Unknown       Review of systems:  Constitutional: feels \"fine,\" no recent fever, night sweats. Sleeping well. No new pains.       PHYSICAL EXAM:  Vitals per Nursing staff: Blood pressure 152/83, pulse 67, temperature 98.5  F (36.9  C), resp. rate 18, SpO2 97 %.RA  GENERAL APPEARANCE: Well developed elderly, white male in no acute distress. Alert, oriented x 3.  SKIN: Warm and dry. There is no diaphoresis, cyanosis, rashes, or lesions.   HEENT: Head normocephalic, atraumatic. Pupils equal and reactive. Eyes without redness, drainage, or discharge. Nose without drainage, lips and mouth moist. Neck is supple.  LUNGS: Normal respirations, fine velcro rales bilateral bases  HEART: Regular rhythm and rate; S1 and S2, no murmur, gallop or rub  ABDOMEN: Bowel sounds normal; Abdomen soft, no tenderness or guarding  NEUROLOGIC: Alert. No focal neuro deficits.    EXTREMITIES: " No bilateral lower extremity edema.   PSYCHIATRIC: Mood and affect congruent, pleasant      ASSESSMENT:  1. Dizziness    2. History of recent fall      PLAN:  Patient is advised to press his pendent and wait for staff assistance before transferring/walking.   He is not on any bp medications.   [facility]     Antonietta Pérez, CNP

## 2019-01-24 DIAGNOSIS — G89.4 CHRONIC PAIN SYNDROME: ICD-10-CM

## 2019-01-24 RX ORDER — HYDROCODONE BITARTRATE AND ACETAMINOPHEN 5; 325 MG/1; MG/1
1 TABLET ORAL AT BEDTIME
Qty: 35 TABLET | Refills: 0 | Status: SHIPPED | OUTPATIENT
Start: 2019-01-24 | End: 2019-03-27

## 2019-02-06 ENCOUNTER — NURSING HOME VISIT (OUTPATIENT)
Dept: GERIATRICS | Facility: OTHER | Age: 79
End: 2019-02-06
Attending: NURSE PRACTITIONER
Payer: COMMERCIAL

## 2019-02-06 VITALS
RESPIRATION RATE: 16 BRPM | HEART RATE: 74 BPM | SYSTOLIC BLOOD PRESSURE: 115 MMHG | DIASTOLIC BLOOD PRESSURE: 51 MMHG | HEIGHT: 70 IN | WEIGHT: 195 LBS | TEMPERATURE: 98 F | BODY MASS INDEX: 27.92 KG/M2 | OXYGEN SATURATION: 94 %

## 2019-02-06 DIAGNOSIS — R42 DIZZINESS: Primary | ICD-10-CM

## 2019-02-06 DIAGNOSIS — Z91.81 H/O FALL: ICD-10-CM

## 2019-02-06 DIAGNOSIS — R29.898 WEAKNESS OF BOTH LOWER EXTREMITIES: ICD-10-CM

## 2019-02-06 ASSESSMENT — MIFFLIN-ST. JEOR: SCORE: 1595.76

## 2019-02-10 PROBLEM — Z91.81 H/O FALL: Status: ACTIVE | Noted: 2019-02-10

## 2019-02-10 NOTE — PROGRESS NOTES
"[facility] - [facility] [place] [place] CARE    Sanchez Hensley  : 1940  MRN: [id number]  Primary Care Physician: Antonietta Pérez [facility].     2019     HPI: Sanchez Hensley is a 78 year old male being seen today at [facility] for several recent falls. Rojas has had episodes of dizziness in the past, today he describes these episodes as \"my head is spinning.\" He denies fainting and has had no LOC with any of his falls.   Baseline function is use of 4 wheeled walker for ambulation.     Rojas is quite hard of hearing despite use of bilateral hearing aids. His mentation seems clear however he is a limited historian.   He does see a provider through the [facility] and had a [facility] appointment in mid January. He states \"they told me I might have blood cancer.\" He returned from the VA appointment with no new orders per [facility] nurse.   He does have known leukocytosis.   He also states his family is working with the [facility] to get him moved to [facility] which does have a VA contract. Currently [facility] has no VA openings.     Rojas has no acute concerns at this times.     Medication reconciliation: completed.     CODE STATUS: DNR/DNI    Patient Active Problem List    Diagnosis Date Noted     H/O fall 02/10/2019     Priority: Medium     IPF (idiopathic pulmonary fibrosis) (H) 2018     Priority: Medium     Leukocytosis 2016     Priority: Medium     Abdominal aortic aneurysm (AAA) without rupture (H) 2016     Priority: Medium     Long term current use of opiate analgesic 2015     Priority: Medium     Overview:   Overview:   [facility] Treatment Agreement: Controlled Substances for Pain Management 2015  [facility] Agreement for Opioid Treatment  PHYSICIAN:  Bob Alvarado MD  PHARMACY:  [facility] in [place], [place]  PHARMACY PHONE:  [phone]    7-8 injections with Dr. Abdullahi Pemberton over a period of 3 years, with very limited effect.        Hearing loss of " right ear 03/30/2015     Priority: Medium     Difficulty sleeping 10/02/2014     Priority: Medium     Weakness of lower extremity 10/02/2014     Priority: Medium     HCD (health care directive) 08/27/2014     Priority: Medium     Gastroesophageal reflux disease 01/23/2014     Priority: Medium     Anxiety, generalized 09/23/2013     Priority: Medium     Overview:   Overview:   GAD7 - 3/27/14 (3)       Major depressive disorder 09/23/2013     Priority: Medium     Overview:   Overview:   PHQ9 - 11/6/14 (0)  PHQ9 - 10/2/14 (6)  PHQ9 - 8/21/14 (2)       Arthropathy of lumbar facet joint 03/26/2013     Priority: Medium     Blood glucose abnormal 03/26/2013     Priority: Medium     Osteoarthritis of lumbosacral spine without myelopathy 03/26/2013     Priority: Medium     Overview:   Overview:   neurolysis, injections, no help. Has seen Dr. 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  Social History     Socioeconomic History     Marital status:      Spouse name: Not on file     Number of children: Not on file     Years of education: Not on file     Highest education level: Not on file   Social Needs     Financial resource strain: Not on file     Food insecurity - worry: Not on file     Food insecurity - inability: Not on file     Transportation needs - medical: Not on file     Transportation needs - non-medical: Not on file   Occupational History     Not on file   Tobacco Use     Smoking status: Never Smoker     Smokeless tobacco: Never Used   Substance and Sexual Activity     Alcohol  "use: No     Drug use: Unknown     Types: Other     Comment: Drug use: No     Sexual activity: No   Other Topics Concern     Not on file   Social History Narrative     Not on file     No family history on file.  Current Outpatient Medications   Medication Sig Dispense Refill     Acidophilus Lactobacillus CAPS Take 1 capsule by mouth daily       buPROPion (WELLBUTRIN XL) 150 MG 24 hr tablet Take 1 tablet by mouth daily Do not chew or crush       celecoxib (CELEBREX) 100 MG capsule Take 1 capsule by mouth 2 times daily       clonazePAM (KLONOPIN) 0.5 MG tablet Take 1 tablet (0.5 mg) by mouth 2 times daily as needed for anxiety 20 tablet 0     fluvoxaMINE (LUVOX) 100 MG tablet Take 1 tablet by mouth 2 times daily       gabapentin (NEURONTIN) 300 MG capsule Take 1 capsule (300 mg) by mouth 3 times daily 180 capsule 5     HYDROcodone-acetaminophen (NORCO) 5-325 MG tablet Take 1 tablet by mouth At Bedtime MAY ALSO TAKE ONE TAB EVERY 8 HOURS PRN PAIN. 35 tablet 0     pantoprazole (PROTONIX) 40 MG EC tablet Take 40 mg by mouth       polyethylene glycol (MIRALAX) powder Take 17 g (1 capful) by mouth daily as needed for constipation 510 g 1     Allergies   Allergen Reactions     Aspirin Hives     Nasal congestion     Morphine      Other reaction(s): *Unknown  Nausea, dizzy, sick to stomach     Naproxen      Other reaction(s): *Unknown  Bothered stomach     Sulfasalazine      Other reaction(s): *Unknown       Review of systems:  Constitutional: feels \"fine,\" no recent fever, night sweats. Sleeping well. Chronic pain issues, no new or acute pain. Very [place].       PHYSICAL EXAM:  Vitals per Nursing staff: Blood pressure 115/51, pulse 74, temperature 98  F (36.7  C), resp. rate 16, height 1.778 m (5' 10\"), weight 88.5 kg (195 lb), SpO2 94 %.RA  GENERAL APPEARANCE: Well developed elderly, white male in no acute distress. Alert, oriented x 3, forgetful.  SKIN: Warm and dry. There is no diaphoresis, cyanosis, rashes, or lesions. "   HEENT: Head normocephalic, atraumatic. Eyes without redness, drainage, or discharge. Nose without drainage, lips and mouth moist. Neck is supple, no bruits.   LUNGS: Normal respirations, fine velcro rales in bilateral bases.   HEART: Regular rhythm and rate; S1 and S2, no murmur, gallop or rub  ABDOMEN: Bowel sounds normal; Abdomen soft, no tenderness or guarding  NEUROLOGIC: Alert. No focal neuro deficits.    EXTREMITIES: No bilateral lower extremity edema.   PSYCHIATRIC: Mood and affect congruent, pleasant      ASSESSMENT:  1. Dizziness    2. Weakness of both lower extremities    3. H/O fall      PLAN:  Will have PT eval and treat for dizziness - possible BPPV. He will need in-home therapy as he is unable to leave home without the assistance of another person due to weakness, balance disturbances, and falls. When he does leave home, it is for medical appointments only. This is a Face to Face for in-home services.    [facility] who is [facility] him [facility] for his leukocytosis. His VA care team includes a  pharmacist for his psychiatric medication management.    Rojas coffey give [facility] verbal consent for me to speak with VA staff on his behalf. I did speak with Maria Alejandra [place] [facility] nurse supervisor, who reports there are new medication orders that were not released to patient or AL facility; she will fax [facility] new med orders.     Antonietta Pérez, CNP

## 2019-02-20 ENCOUNTER — NURSING HOME VISIT (OUTPATIENT)
Dept: GERIATRICS | Facility: OTHER | Age: 79
End: 2019-02-20
Attending: NURSE PRACTITIONER
Payer: COMMERCIAL

## 2019-02-20 VITALS
HEART RATE: 79 BPM | RESPIRATION RATE: 16 BRPM | OXYGEN SATURATION: 95 % | TEMPERATURE: 96.7 F | DIASTOLIC BLOOD PRESSURE: 69 MMHG | SYSTOLIC BLOOD PRESSURE: 125 MMHG

## 2019-02-20 DIAGNOSIS — F33.41 RECURRENT MAJOR DEPRESSIVE DISORDER, IN PARTIAL REMISSION (H): ICD-10-CM

## 2019-02-20 DIAGNOSIS — G89.4 CHRONIC PAIN SYNDROME: Primary | ICD-10-CM

## 2019-02-20 DIAGNOSIS — F42.9 OBSESSIVE-COMPULSIVE DISORDER, UNSPECIFIED TYPE: ICD-10-CM

## 2019-02-20 DIAGNOSIS — F41.1 ANXIETY, GENERALIZED: ICD-10-CM

## 2019-02-20 NOTE — PROGRESS NOTES
"[facility]    Sanchez Hensley  : 1940  MRN: [id number]  Primary Care Physician: Antonietta Pérez [facility].     2019     HPI: Sanchez Hensley is a 78 year old male being seen today at [facility] for follow up of numerous medication changes per his VA provider.   His VA provider did stop his gabapentin, luvox, and wellbutrin and started him on pregabalin and cymbalta.     Today, Rojas appears his usual, sitting in his recliner. His mood is pleasant, he is joking. He feels his mood is his usual, he does feel his pain may be somewhat better with the recent medication changes. Staff do report he has not used any prn lorazepam since early January and has not taken any prn norco.     Rojas states he was told at his last VA appointment in January of possible \"blood cancer\" and has a meeting at the [facility] later this week. His son will be going with him. He does have chronic leukocytosis.   He has no new acute complaints at this time.     Medication reconciliation: completed.     CODE STATUS: DNR/DNI    Patient Active Problem List    Diagnosis Date Noted     H/O fall 02/10/2019     Priority: Medium     IPF (idiopathic pulmonary fibrosis) (H) 2018     Priority: Medium     Leukocytosis 2016     Priority: Medium     Abdominal aortic aneurysm (AAA) without rupture (H) 2016     Priority: Medium     Long term current use of opiate analgesic 2015     Priority: Medium     Overview:   Overview:   [facility] Treatment Agreement: Controlled Substances for Pain Management 2015  [facility] Agreement for Opioid Treatment  PHYSICIAN:  Bob Alvarado MD  PHARMACY:  [facility] in [place], [place]  PHARMACY PHONE:  [phone]    7-8 injections with Dr. Abdullahi Pemberton over a period of 3 years, with very limited effect.        Hearing loss of right ear 2015     Priority: Medium     Difficulty sleeping 10/02/2014     Priority: Medium     Weakness " of lower extremity 10/02/2014     Priority: Medium     HCD (health care directive) 08/27/2014     Priority: Medium     Gastroesophageal reflux disease 01/23/2014     Priority: Medium     Anxiety, generalized 09/23/2013     Priority: Medium     Overview:   Overview:   GAD7 - 3/27/14 (3)       Major depressive disorder 09/23/2013     Priority: Medium     Overview:   Overview:   PHQ9 - 11/6/14 (0)  PHQ9 - 10/2/14 (6)  PHQ9 - 8/21/14 (2)       Arthropathy of lumbar facet joint 03/26/2013     Priority: Medium     Blood glucose abnormal 03/26/2013     Priority: Medium     Osteoarthritis of lumbosacral spine without myelopathy 03/26/2013     Priority: Medium     Overview:   Overview:   neurolysis, injections, no help. Has seen Dr. 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  Social History     Socioeconomic History     Marital status:      Spouse name: Not on file     Number of children: Not on file     Years of education: Not on file     Highest education level: Not on file   Occupational History     Not on file   Social Needs     Financial resource strain: Not on file     Food insecurity:     Worry: Not on file     Inability: Not on file     Transportation needs:     Medical: Not on file     Non-medical: Not on file   Tobacco Use     Smoking status: Never Smoker     Smokeless tobacco: Never Used   Substance and Sexual Activity     Alcohol use: No     Drug use: Unknown     Types: Other     Comment: Drug use: No     Sexual activity: No   Lifestyle     Physical activity:     Days per  "week: Not on file     Minutes per session: Not on file     Stress: Not on file   Relationships     Social connections:     Talks on phone: Not on file     Gets together: Not on file     Attends Tenriism service: Not on file     Active member of club or organization: Not on file     Attends meetings of clubs or organizations: Not on file     Relationship status: Not on file     Intimate partner violence:     Fear of current or ex partner: Not on file     Emotionally abused: Not on file     Physically abused: Not on file     Forced sexual activity: Not on file   Other Topics Concern     Not on file   Social History Narrative     Not on file     No family history on file.  Current Outpatient Medications   Medication Sig Dispense Refill     Acidophilus Lactobacillus CAPS Take 1 capsule by mouth daily       celecoxib (CELEBREX) 100 MG capsule Take 1 capsule by mouth 2 times daily       clonazePAM (KLONOPIN) 0.5 MG tablet Take 1 tablet (0.5 mg) by mouth 2 times daily as needed for anxiety 20 tablet 0     DULoxetine (CYMBALTA) 60 MG capsule Take 1 capsule (60 mg) by mouth daily 90 capsule 3     HYDROcodone-acetaminophen (NORCO) 5-325 MG tablet Take 1 tablet by mouth At Bedtime MAY ALSO TAKE ONE TAB EVERY 8 HOURS PRN PAIN. 35 tablet 0     pantoprazole (PROTONIX) 40 MG EC tablet Take 40 mg by mouth       pregabalin (LYRICA) 75 MG capsule TAKE 1 CAP BID THRU 2/24; ON 2/25 TAKE 1 CAP PO IN A.M X 7 DAYS AND 2 CAPS AT HS X 7 DAYS; ON 3/5, TAKE 2 CAPS PO  capsule 0     Allergies   Allergen Reactions     Aspirin Hives     Nasal congestion     Morphine      Other reaction(s): *Unknown  Nausea, dizzy, sick to stomach     Naproxen      Other reaction(s): *Unknown  Bothered stomach     Sulfasalazine      Other reaction(s): *Unknown       Review of systems:  Constitutional: feels \"fine,\" no recent fever, night sweats. Sleeping well. Chronic pain issues to back and legs, no new or acute pain. Feels the pain is somewhat better. " "Very [place].       PHYSICAL EXAM:  Vitals per Nursing staff: Blood pressure 125/69, pulse 79, temperature 96.7  F (35.9  C), resp. rate 16, SpO2 95 %.RA  GENERAL APPEARANCE: Well developed elderly, white male in no acute distress. Alert, oriented x 3, forgetful.  SKIN: Warm and dry. There is no diaphoresis, cyanosis, rashes, or lesions.   HEENT: Head normocephalic, atraumatic. Eyes without redness, drainage, or discharge. Nose without drainage, lips and mouth moist. Neck is supple, no bruits.   LUNGS: Normal respirations, fine velcro rales in bilateral bases.   HEART: Regular rhythm and rate; S1 and S2, no murmur, gallop or rub  ABDOMEN: Bowel sounds normal; Abdomen soft, no tenderness or guarding  NEUROLOGIC: Alert. He does have pain and numbness to his LE's.   EXTREMITIES: No bilateral lower extremity edema.   PSYCHIATRIC: Mood and affect congruent, pleasant      ASSESSMENT:  1. Chronic pain syndrome    2. Anxiety, generalized    3. Recurrent major depressive disorder, in partial remission (H)    4. Obsessive-compulsive disorder, unspecified type      PLAN:  Patient mood and affect have been stable with no emergence of symptoms or behavior disturbances.   Patient does believe there has been some improvement in his baseline pain since recent medication changes.  He [facility] this [facility] for \"blood cancer.\"     Antonietta Pérez CNP    "

## 2019-02-21 RX ORDER — DULOXETIN HYDROCHLORIDE 60 MG/1
60 CAPSULE, DELAYED RELEASE ORAL DAILY
Qty: 90 CAPSULE | Refills: 3 | COMMUNITY
Start: 2019-02-21 | End: 2019-05-03

## 2019-02-21 RX ORDER — PREGABALIN 75 MG/1
2 CAPSULE ORAL 2 TIMES DAILY
Qty: 734 CAPSULE | Refills: 0 | COMMUNITY
Start: 2019-02-21 | End: 2019-01-01

## 2019-03-04 DIAGNOSIS — G89.4 CHRONIC PAIN SYNDROME: ICD-10-CM

## 2019-03-04 RX ORDER — HYDROCODONE BITARTRATE AND ACETAMINOPHEN 5; 325 MG/1; MG/1
1 TABLET ORAL AT BEDTIME
Qty: 35 TABLET | OUTPATIENT
Start: 2019-03-04

## 2019-03-04 NOTE — TELEPHONE ENCOUNTER
INJECTION SCHEDULED ON: Wednesday 6/14/17 at 1:00 pm.    Arrive at [facility] in [place] at 12:15 pm.    Nothing to eat or drink 6 hours prior to injection (after 7:00 am). Eat breakfast prior to 7:00 am.    Have a  to take you home after injection.    No Aspirin or Aspirin products 5 days prior to the injection    Follow-up in the clinic 2 weeks after the injection.         [facility] #[id number] sent Rx request for the following:      HYDROcodone-acetaminophen (NORCO) 5-325 MG tablet  Sig: Take 1 tablet by mouth At Bedtime MAY ALSO TAKE ONE TAB EVERY 8 HOURS PRN PAIN.  Last Prescription Date:   1/27/19  Last Fill Qty/Refills:         35, R-0    Last Office Visit:              2/20/19 ([facility] Visit)  Future Office visit:           None.    Routing refill request to provider for review/approval because:  Drug not on the [facility], [facility] or [facility] refill protocol or controlled substance    Unable to complete prescription refill per RN Medication Refill Policy. Ashlyn Elliott RN .............. 3/4/2019  11:04 AM

## 2019-03-06 ENCOUNTER — NURSING HOME VISIT (OUTPATIENT)
Dept: GERIATRICS | Facility: OTHER | Age: 79
End: 2019-03-06
Attending: NURSE PRACTITIONER
Payer: COMMERCIAL

## 2019-03-06 DIAGNOSIS — D72.828 OTHER ELEVATED WHITE BLOOD CELL (WBC) COUNT: Primary | ICD-10-CM

## 2019-03-06 DIAGNOSIS — Z71.89 ADVANCE CARE PLANNING: ICD-10-CM

## 2019-03-09 VITALS
DIASTOLIC BLOOD PRESSURE: 82 MMHG | SYSTOLIC BLOOD PRESSURE: 128 MMHG | RESPIRATION RATE: 16 BRPM | OXYGEN SATURATION: 92 % | HEART RATE: 80 BPM | TEMPERATURE: 97.2 F

## 2019-03-09 PROBLEM — Z71.89 ADVANCE CARE PLANNING: Status: ACTIVE | Noted: 2019-03-09
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
## 2019-03-27 DIAGNOSIS — G89.4 CHRONIC PAIN SYNDROME: ICD-10-CM

## 2019-03-27 RX ORDER — HYDROCODONE BITARTRATE AND ACETAMINOPHEN 5; 325 MG/1; MG/1
1 TABLET ORAL AT BEDTIME
Qty: 30 TABLET | Refills: 0
Start: 2019-03-27 | End: 2019-04-17

## 2019-04-17 DIAGNOSIS — G89.4 CHRONIC PAIN SYNDROME: ICD-10-CM

## 2019-04-17 RX ORDER — HYDROCODONE BITARTRATE AND ACETAMINOPHEN 5; 325 MG/1; MG/1
1 TABLET ORAL AT BEDTIME
Qty: 30 TABLET | Refills: 0
Start: 2019-04-17 | End: 2019-01-01

## 2019-04-30 ENCOUNTER — DOCUMENTATION ONLY (OUTPATIENT)
Dept: OTHER | Facility: CLINIC | Age: 79
End: 2019-04-30

## 2019-04-30 PROBLEM — Z71.89 ADVANCE CARE PLANNING: Status: RESOLVED | Noted: 2019-03-09 | Resolved: 2019-04-30

## 2019-05-03 ENCOUNTER — OFFICE VISIT (OUTPATIENT)
Dept: FAMILY MEDICINE | Facility: OTHER | Age: 79
End: 2019-05-03
Attending: PHYSICIAN ASSISTANT
Payer: COMMERCIAL

## 2019-05-03 VITALS
HEART RATE: 84 BPM | DIASTOLIC BLOOD PRESSURE: 78 MMHG | HEIGHT: 70 IN | RESPIRATION RATE: 20 BRPM | SYSTOLIC BLOOD PRESSURE: 102 MMHG | BODY MASS INDEX: 27.63 KG/M2 | WEIGHT: 193 LBS

## 2019-05-03 DIAGNOSIS — F41.1 ANXIETY, GENERALIZED: ICD-10-CM

## 2019-05-03 DIAGNOSIS — H61.23 BILATERAL IMPACTED CERUMEN: ICD-10-CM

## 2019-05-03 DIAGNOSIS — D72.828 OTHER ELEVATED WHITE BLOOD CELL (WBC) COUNT: Primary | ICD-10-CM

## 2019-05-03 DIAGNOSIS — F33.41 RECURRENT MAJOR DEPRESSIVE DISORDER, IN PARTIAL REMISSION (H): ICD-10-CM

## 2019-05-03 PROCEDURE — 99214 OFFICE O/P EST MOD 30 MIN: CPT | Performed by: PHYSICIAN ASSISTANT

## 2019-05-03 PROCEDURE — G0463 HOSPITAL OUTPT CLINIC VISIT: HCPCS

## 2019-05-03 PROCEDURE — 69209 REMOVE IMPACTED EAR WAX UNI: CPT | Performed by: PHYSICIAN ASSISTANT

## 2019-05-03 RX ORDER — DULOXETIN HYDROCHLORIDE 60 MG/1
120 CAPSULE, DELAYED RELEASE ORAL DAILY
COMMUNITY
Start: 2019-05-03 | End: 2019-06-25

## 2019-05-03 ASSESSMENT — MIFFLIN-ST. JEOR: SCORE: 1586.69

## 2019-05-03 ASSESSMENT — PATIENT HEALTH QUESTIONNAIRE - PHQ9: SUM OF ALL RESPONSES TO PHQ QUESTIONS 1-9: 0
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
## 2019-05-03 NOTE — PATIENT INSTRUCTIONS
Completed medication and history evaluation.     Flushed out cerumen from ears on both sides.   No concerns were appreciated.     Primary care provider - Dr. Ton Fonseca

## 2019-05-03 NOTE — NURSING NOTE
Chief Complaint   Patient presents with     Medication Therapy Management     H and P for [facility]         Medication Reconciliation: complete    Tammy Madrid, LPN

## 2019-06-11 ENCOUNTER — HOSPITAL ENCOUNTER (EMERGENCY)
Facility: OTHER | Age: 79
Discharge: NURSING, REHAB OR LONG-TERM CARE | End: 2019-06-11
Attending: EMERGENCY MEDICINE | Admitting: EMERGENCY MEDICINE
Payer: MEDICARE

## 2019-06-11 ENCOUNTER — APPOINTMENT (OUTPATIENT)
Dept: CT IMAGING | Facility: OTHER | Age: 79
End: 2019-06-11
Attending: EMERGENCY MEDICINE
Payer: MEDICARE

## 2019-06-11 VITALS
DIASTOLIC BLOOD PRESSURE: 80 MMHG | HEART RATE: 76 BPM | RESPIRATION RATE: 16 BRPM | TEMPERATURE: 97.6 F | SYSTOLIC BLOOD PRESSURE: 144 MMHG
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
## 2019-06-11 LAB
ALBUMIN UR-MCNC: NEGATIVE MG/DL
ANION GAP SERPL CALCULATED.3IONS-SCNC: 9 MMOL/L (ref 3–14)
APPEARANCE UR: CLEAR
BASOPHILS # BLD AUTO: 0.2 10E9/L (ref 0–0.2)
BASOPHILS NFR BLD AUTO: 1 %
BILIRUB UR QL STRIP: NEGATIVE
BUN SERPL-MCNC: 23 MG/DL (ref 7–25)
CALCIUM SERPL-MCNC: 8.9 MG/DL (ref 8.6–10.3)
CHLORIDE SERPL-SCNC: 108 MMOL/L (ref 98–107)
CO2 SERPL-SCNC: 25 MMOL/L (ref 21–31)
COLOR UR AUTO: YELLOW
CREAT SERPL-MCNC: 1.33 MG/DL (ref 0.7–1.3)
DIFFERENTIAL METHOD BLD: ABNORMAL
EOSINOPHIL # BLD AUTO: 1 10E9/L (ref 0–0.7)
EOSINOPHIL NFR BLD AUTO: 6.1 %
ERYTHROCYTE [DISTWIDTH] IN BLOOD BY AUTOMATED COUNT: 15.9 % (ref 10–15)
GFR SERPL CREATININE-BSD FRML MDRD: 52 ML/MIN/{1.73_M2}
GLUCOSE SERPL-MCNC: 122 MG/DL (ref 70–105)
GLUCOSE UR STRIP-MCNC: NEGATIVE MG/DL
HCT VFR BLD AUTO: 40.3 % (ref 40–53)
HGB BLD-MCNC: 13.1 G/DL (ref 13.3–17.7)
HGB UR QL STRIP: ABNORMAL
IMM GRANULOCYTES # BLD: 0.1 10E9/L (ref 0–0.4)
IMM GRANULOCYTES NFR BLD: 0.7 %
KETONES UR STRIP-MCNC: NEGATIVE MG/DL
LEUKOCYTE ESTERASE UR QL STRIP: ABNORMAL
LYMPHOCYTES # BLD AUTO: 2.6 10E9/L (ref 0.8–5.3)
LYMPHOCYTES NFR BLD AUTO: 15.6 %
MAGNESIUM SERPL-MCNC: 2.1 MG/DL (ref 1.9–2.7)
MCH RBC QN AUTO: 31.1 PG (ref 26.5–33)
MCHC RBC AUTO-ENTMCNC: 32.5 G/DL (ref 31.5–36.5)
MCV RBC AUTO: 96 FL (ref 78–100)
MONOCYTES # BLD AUTO: 1.6 10E9/L (ref 0–1.3)
MONOCYTES NFR BLD AUTO: 9.7 %
NEUTROPHILS # BLD AUTO: 11 10E9/L (ref 1.6–8.3)
NEUTROPHILS NFR BLD AUTO: 66.9 %
NITRATE UR QL: NEGATIVE
PH UR STRIP: 6 PH (ref 5–9)
PLATELET # BLD AUTO: 540 10E9/L (ref 150–450)
POTASSIUM SERPL-SCNC: 4.2 MMOL/L (ref 3.5–5.1)
RBC # BLD AUTO: 4.21 10E12/L (ref 4.4–5.9)
RBC #/AREA URNS AUTO: NORMAL /HPF
SODIUM SERPL-SCNC: 142 MMOL/L (ref 134–144)
SOURCE: ABNORMAL
SP GR UR STRIP: 1.02 (ref 1–1.03)
UROBILINOGEN UR STRIP-ACNC: 0.2 EU/DL (ref 0.2–1)
WBC # BLD AUTO: 16.5 10E9/L (ref 4–11)
WBC #/AREA URNS AUTO: NORMAL /HPF

## 2019-06-11 PROCEDURE — 85025 COMPLETE CBC W/AUTO DIFF WBC: CPT | Performed by: EMERGENCY MEDICINE

## 2019-06-11 PROCEDURE — 83735 ASSAY OF MAGNESIUM: CPT | Performed by: EMERGENCY MEDICINE

## 2019-06-11 PROCEDURE — 81001 URINALYSIS AUTO W/SCOPE: CPT | Performed by: EMERGENCY MEDICINE

## 2019-06-11 PROCEDURE — 70450 CT HEAD/BRAIN W/O DYE: CPT

## 2019-06-11 PROCEDURE — 72125 CT NECK SPINE W/O DYE: CPT

## 2019-06-11 PROCEDURE — 12013 RPR F/E/E/N/L/M 2.6-5.0 CM: CPT | Performed by: EMERGENCY MEDICINE

## 2019-06-11 PROCEDURE — 99283 EMERGENCY DEPT VISIT LOW MDM: CPT | Mod: 25 | Performed by: EMERGENCY MEDICINE

## 2019-06-11 PROCEDURE — 12013 RPR F/E/E/N/L/M 2.6-5.0 CM: CPT | Mod: Z6 | Performed by: EMERGENCY MEDICINE

## 2019-06-11 PROCEDURE — 27210282 ZZH ADHESIVE DERMABOND SKIN: Performed by: EMERGENCY MEDICINE

## 2019-06-11 PROCEDURE — 99285 EMERGENCY DEPT VISIT HI MDM: CPT | Mod: 25 | Performed by: EMERGENCY MEDICINE

## 2019-06-11 PROCEDURE — 70486 CT MAXILLOFACIAL W/O DYE: CPT

## 2019-06-11 PROCEDURE — 80048 BASIC METABOLIC PNL TOTAL CA: CPT | Performed by: EMERGENCY MEDICINE

## 2019-06-11 PROCEDURE — 36415 COLL VENOUS BLD VENIPUNCTURE: CPT | Performed by: EMERGENCY MEDICINE

## 2019-06-11 ASSESSMENT — ENCOUNTER SYMPTOMS
COLOR CHANGE: 0
DIZZINESS: 0
ABDOMINAL PAIN: 0
FATIGUE: 0
SHORTNESS OF BREATH: 0
CHILLS: 0
FEVER: 0
NECK STIFFNESS: 1
DIFFICULTY URINATING: 0
NUMBNESS: 0
MYALGIAS: 1
BACK PAIN: 1
NECK PAIN: 1
LIGHT-HEADEDNESS: 0

## 2019-06-11 NOTE — ED TRIAGE NOTES
Pt fell at [employer] in the dining room.  Pt states his right leg gave out, and he tripped and fell face first to the hard wood floor.  Pt complain of right knee pain and forehead pain.  Has small lacerations to forehead above left eyebrow, nose, and under nose.  No anticoagulants noted on med list.

## 2019-06-11 NOTE — ED AVS SNAPSHOT
[facility]  [address]  [place] [address]  Phone:  [phone]  Fax:  [phone]                                    Sanchez Hensley   MRN: [id number]    Department:  [facility]   Date of Visit:  6/11/2019           After Visit Summary Signature Page    I have received my discharge instructions, and my questions have been answered. I have discussed any challenges I see with this plan with the nurse or doctor.    ..........................................................................................................................................  Patient/Patient Representative Signature      ..........................................................................................................................................  Patient Representative Print Name and Relationship to Patient    ..................................................               ................................................  Date                                   Time    ..........................................................................................................................................  Reviewed by Signature/Title    ...................................................              ..............................................  Date                                               Time          22EPIC Rev 08/18
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
## 2019-06-11 NOTE — PROGRESS NOTES
Pt comes to ER via EMS from [facility].  Pt fell face first to the floor in the dining room and has lacerations to his face.  B/P 154/80.  HR 79.  Sats 96% in room air.

## 2019-06-11 NOTE — ED NOTES
Patient ambulated, patient denies weakness/dizziness.  MD notified, plan to discharge patient home.

## 2019-06-18 ENCOUNTER — TRANSFERRED RECORDS (OUTPATIENT)
Dept: HEALTH INFORMATION MANAGEMENT | Facility: OTHER | Age: 79
End: 2019-06-18

## 2019-06-25 DIAGNOSIS — F33.41 RECURRENT MAJOR DEPRESSIVE DISORDER, IN PARTIAL REMISSION (H): Primary | ICD-10-CM

## 2019-06-26 RX ORDER — DULOXETIN HYDROCHLORIDE 60 MG/1
120 CAPSULE, DELAYED RELEASE ORAL DAILY
Qty: 14 CAPSULE | Refills: 0 | Status: SHIPPED | OUTPATIENT
Start: 2019-06-26 | End: 2019-01-01

## 2019-06-26 NOTE — TELEPHONE ENCOUNTER
"Routing refill request to provider for review/approval because:  Medication is reported/historical    Note from pharmacy : \"Note from pharmacy states they need 2 weeks worth [facility] they [facility]. Would you ok 2 weeks worth?\"    LOV: 5/3/19    Renate Arauz RN on 6/26/2019 at 9:38 AM          "

## 2019-09-18 ENCOUNTER — NURSING HOME VISIT (OUTPATIENT)
Dept: GERIATRICS | Facility: OTHER | Age: 79
End: 2019-09-18
Attending: NURSE PRACTITIONER
Payer: COMMERCIAL

## 2019-09-18 VITALS
DIASTOLIC BLOOD PRESSURE: 77 MMHG | TEMPERATURE: 98.2 F | OXYGEN SATURATION: 93 % | RESPIRATION RATE: 16 BRPM | HEART RATE: 83 BPM | SYSTOLIC BLOOD PRESSURE: 124 MMHG

## 2019-09-18 DIAGNOSIS — R21 RASH AND NONSPECIFIC SKIN ERUPTION: Primary | ICD-10-CM

## 2019-09-18 DIAGNOSIS — H61.22 IMPACTED CERUMEN OF LEFT EAR: ICD-10-CM

## 2019-09-18 RX ORDER — TRIAMCINOLONE ACETONIDE 1 MG/G
CREAM TOPICAL 2 TIMES DAILY
Qty: 85.2 G | Refills: 1 | Status: SHIPPED | OUTPATIENT
Start: 2019-09-18 | End: 2019-01-01
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
## 2019-09-19 RX ORDER — EMOLLIENT BASE
CREAM (GRAM) TOPICAL 2 TIMES DAILY
Qty: 453 G | Refills: 1 | Status: SHIPPED | OUTPATIENT
Start: 2019-09-19 | End: 2019-01-01
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
## 2019-10-16 PROBLEM — K59.09 OTHER CONSTIPATION: Status: ACTIVE | Noted: 2019-01-01

## 2019-10-21 NOTE — TELEPHONE ENCOUNTER
MP is requesting an order to discontinue Senna and order dulcolax     They also need [facility] copy for the [facility]

## 2019-10-21 NOTE — TELEPHONE ENCOUNTER
Debby the nurse from  is requesting that patients senna be discontinued and replaced with 5 mg of Dulcolax PRN. Would like Doc of the day to address as Primary if out of office.  Kamila Mcguire LPN on 10/21/2019 at 10:59 AM

## 2019-10-22 NOTE — TELEPHONE ENCOUNTER
Spoke with Debby. She will fax us a form for Gudelia Esparza to sign.    Mimi Feliz LPN.................. 10/22/2019 10:24 AM

## 2019-10-23 NOTE — TELEPHONE ENCOUNTER
Received paperwork requesting quantity and refills for bisacodyl . See your in box. Dionna Aguayo LPN ....................10/23/2019  9:34 AM

## 2019-10-28 NOTE — TELEPHONE ENCOUNTER
Talked with Debby and she will call [facility] to get the telephone note dated 10/21/19.  Sue Enrique LPN, LPN  10/28/2019  10:02 AM

## 2019-11-04 NOTE — TELEPHONE ENCOUNTER
Switched to Dulcolax 10/21/19, refusing medication at this time.  Unable to complete prescription refill per RN Medication Refill Policy. Joaquina Ramsey RN 11/4/2019 3:37 PM

## 2019-11-26 NOTE — NURSING NOTE
Pt presents to clinic today for face to face.      Medication Reconciliation: complete  Kinga Bowles LPN
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
## 2019-11-27 NOTE — TELEPHONE ENCOUNTER
After verifying pts name and date of birth with Kory, Kory states they are waiting for the papers to be faxed over. Called [facility] 1 float and float will refax paper work this am.  Kinga Bowles LPN

## 2019-11-27 NOTE — TELEPHONE ENCOUNTER
States pt only has one pill left of lyrica and that the [facility] has a rush on the [facility]. She said she wanted [facility] to know that pt will not have any lyrica until it gets there

## 2019-11-27 NOTE — TELEPHONE ENCOUNTER
Kelly from [facility] notified that RX are sent to [facility] yesterday and should be ready for .  Carolyne Verdugo, BRENDAN

## 2019-11-27 NOTE — TELEPHONE ENCOUNTER
Kelly from [facility] notified we did sent in a limited supply for the [facility] and it is able to pick this up at his pharmacy if they are needing it.   Carolyne Verdugo, BRENDAN

## 2020-01-01 ENCOUNTER — TELEPHONE (OUTPATIENT)
Dept: FAMILY MEDICINE | Facility: OTHER | Age: 80
End: 2020-01-01

## 2020-01-01 ENCOUNTER — APPOINTMENT (OUTPATIENT)
Dept: GENERAL RADIOLOGY | Facility: OTHER | Age: 80
End: 2020-01-01
Attending: FAMILY MEDICINE
Payer: MEDICARE

## 2020-01-01 ENCOUNTER — TRANSFERRED RECORDS (OUTPATIENT)
Dept: HEALTH INFORMATION MANAGEMENT | Facility: OTHER | Age: 80
End: 2020-01-01

## 2020-01-01 ENCOUNTER — MEDICAL CORRESPONDENCE (OUTPATIENT)
Dept: HEALTH INFORMATION MANAGEMENT | Facility: OTHER | Age: 80
End: 2020-01-01

## 2020-01-01 ENCOUNTER — NURSING HOME VISIT (OUTPATIENT)
Dept: GERIATRICS | Facility: OTHER | Age: 80
End: 2020-01-01
Attending: NURSE PRACTITIONER
Payer: COMMERCIAL

## 2020-01-01 ENCOUNTER — OFFICE VISIT (OUTPATIENT)
Dept: FAMILY MEDICINE | Facility: OTHER | Age: 80
End: 2020-01-01
Attending: FAMILY MEDICINE
Payer: COMMERCIAL

## 2020-01-01 ENCOUNTER — HOSPITAL ENCOUNTER (EMERGENCY)
Facility: OTHER | Age: 80
Discharge: ANOTHER HOSPITAL | End: 2020-09-10
Attending: FAMILY MEDICINE | Admitting: FAMILY MEDICINE
Payer: MEDICARE

## 2020-01-01 ENCOUNTER — NURSING HOME VISIT (OUTPATIENT)
Dept: GERIATRICS | Facility: OTHER | Age: 80
End: 2020-01-01
Attending: FAMILY MEDICINE
Payer: COMMERCIAL

## 2020-01-01 ENCOUNTER — APPOINTMENT (OUTPATIENT)
Dept: CT IMAGING | Facility: OTHER | Age: 80
End: 2020-01-01
Attending: FAMILY MEDICINE
Payer: MEDICARE

## 2020-01-01 VITALS
OXYGEN SATURATION: 91 % | HEART RATE: 94 BPM | DIASTOLIC BLOOD PRESSURE: 62 MMHG | WEIGHT: 189.8 LBS | TEMPERATURE: 98.3 F | RESPIRATION RATE: 16 BRPM | SYSTOLIC BLOOD PRESSURE: 124 MMHG | BODY MASS INDEX: 28.94 KG/M2

## 2020-01-01 VITALS
DIASTOLIC BLOOD PRESSURE: 70 MMHG | RESPIRATION RATE: 25 BRPM | TEMPERATURE: 97.3 F | HEART RATE: 64 BPM | SYSTOLIC BLOOD PRESSURE: 119 MMHG | OXYGEN SATURATION: 96 %

## 2020-01-01 DIAGNOSIS — J96.01 ACUTE RESPIRATORY FAILURE WITH HYPOXIA (H): ICD-10-CM

## 2020-01-01 DIAGNOSIS — G89.4 CHRONIC PAIN DISORDER: ICD-10-CM

## 2020-01-01 DIAGNOSIS — F41.1 ANXIETY, GENERALIZED: ICD-10-CM

## 2020-01-01 DIAGNOSIS — F32.0 CURRENT MILD EPISODE OF MAJOR DEPRESSIVE DISORDER, UNSPECIFIED WHETHER RECURRENT (H): ICD-10-CM

## 2020-01-01 DIAGNOSIS — H00.015 HORDEOLUM EXTERNUM LEFT LOWER EYELID: Primary | ICD-10-CM

## 2020-01-01 DIAGNOSIS — G89.4 CHRONIC PAIN SYNDROME: Primary | ICD-10-CM

## 2020-01-01 DIAGNOSIS — M79.18 DIFFUSE MYOFASCIAL PAIN SYNDROME: ICD-10-CM

## 2020-01-01 DIAGNOSIS — U07.1 2019 NOVEL CORONAVIRUS DISEASE (COVID-19): ICD-10-CM

## 2020-01-01 DIAGNOSIS — M79.7 FIBROMYALGIA: Primary | ICD-10-CM

## 2020-01-01 DIAGNOSIS — M47.817 OSTEOARTHRITIS OF LUMBOSACRAL SPINE WITHOUT MYELOPATHY: ICD-10-CM

## 2020-01-01 DIAGNOSIS — R29.898 WEAKNESS OF BOTH LOWER EXTREMITIES: ICD-10-CM

## 2020-01-01 DIAGNOSIS — F33.41 RECURRENT MAJOR DEPRESSIVE DISORDER, IN PARTIAL REMISSION (H): ICD-10-CM

## 2020-01-01 DIAGNOSIS — K21.9 GASTROESOPHAGEAL REFLUX DISEASE WITHOUT ESOPHAGITIS: ICD-10-CM

## 2020-01-01 DIAGNOSIS — M47.816 ARTHROPATHY OF LUMBAR FACET JOINT: Primary | ICD-10-CM

## 2020-01-01 DIAGNOSIS — R35.1 FREQUENT URINATION AT NIGHT: ICD-10-CM

## 2020-01-01 DIAGNOSIS — J84.112 IPF (IDIOPATHIC PULMONARY FIBROSIS) (H): ICD-10-CM

## 2020-01-01 LAB
ALBUMIN SERPL-MCNC: 3.4 G/DL (ref 3.5–5.7)
ALBUMIN UR-MCNC: 30 MG/DL
ALP SERPL-CCNC: 111 U/L (ref 34–104)
ALT SERPL W P-5'-P-CCNC: 20 U/L (ref 7–52)
ANION GAP SERPL CALCULATED.3IONS-SCNC: 7 MMOL/L (ref 3–14)
APPEARANCE UR: CLEAR
APTT PPP: 34 SEC (ref 22–37)
AST SERPL W P-5'-P-CCNC: 36 U/L (ref 13–39)
BACTERIA #/AREA URNS HPF: ABNORMAL /HPF
BACTERIA SPEC CULT: NORMAL
BASOPHILS # BLD AUTO: 0.1 10E9/L (ref 0–0.2)
BASOPHILS NFR BLD AUTO: 0.3 %
BILIRUB SERPL-MCNC: 0.9 MG/DL (ref 0.3–1)
BILIRUB UR QL STRIP: NEGATIVE
BUN SERPL-MCNC: 33 MG/DL (ref 7–25)
CALCIUM SERPL-MCNC: 8.2 MG/DL (ref 8.6–10.3)
CHLORIDE SERPL-SCNC: 106 MMOL/L (ref 98–107)
CO2 SERPL-SCNC: 26 MMOL/L (ref 21–31)
COLOR UR AUTO: YELLOW
CREAT SERPL-MCNC: 1.57 MG/DL (ref 0.7–1.3)
CRP SERPL-MCNC: 64.1 MG/L
D DIMER PPP DDU-MCNC: 744 NG/ML D-DU (ref 0–230)
DIFFERENTIAL METHOD BLD: ABNORMAL
EOSINOPHIL # BLD AUTO: 0.2 10E9/L (ref 0–0.7)
EOSINOPHIL NFR BLD AUTO: 1.2 %
ERYTHROCYTE [DISTWIDTH] IN BLOOD BY AUTOMATED COUNT: 19.5 % (ref 10–15)
ERYTHROCYTE [SEDIMENTATION RATE] IN BLOOD BY WESTERGREN METHOD: 39 MM/H (ref 1–10)
FERRITIN SERPL-MCNC: 370 NG/ML (ref 23.9–336.2)
GFR SERPL CREATININE-BSD FRML MDRD: 43 ML/MIN/{1.73_M2}
GLUCOSE SERPL-MCNC: 109 MG/DL (ref 70–105)
GLUCOSE UR STRIP-MCNC: NEGATIVE MG/DL
HCO3 BLDV-SCNC: 24 MMOL/L (ref 21–28)
HCT VFR BLD AUTO: 39 % (ref 40–53)
HGB BLD-MCNC: 12.5 G/DL (ref 13.3–17.7)
HGB UR QL STRIP: NEGATIVE
IMM GRANULOCYTES # BLD: 0.2 10E9/L (ref 0–0.4)
IMM GRANULOCYTES NFR BLD: 0.9 %
INR PPP: 1.21 (ref 0–1.3)
INTERPRETATION ECG - MUSE: NORMAL
KETONES UR STRIP-MCNC: NEGATIVE MG/DL
LABORATORY COMMENT REPORT: ABNORMAL
LACTATE BLD-SCNC: 1.4 MMOL/L (ref 0.7–2)
LDH SERPL L TO P-CCNC: 615 U/L (ref 140–271)
LEUKOCYTE ESTERASE UR QL STRIP: NEGATIVE
LYMPHOCYTES # BLD AUTO: 1.1 10E9/L (ref 0.8–5.3)
LYMPHOCYTES NFR BLD AUTO: 5.6 %
MAGNESIUM SERPL-MCNC: 2.1 MG/DL (ref 1.9–2.7)
MCH RBC QN AUTO: 29.1 PG (ref 26.5–33)
MCHC RBC AUTO-ENTMCNC: 32.1 G/DL (ref 31.5–36.5)
MCV RBC AUTO: 91 FL (ref 78–100)
MONOCYTES # BLD AUTO: 2 10E9/L (ref 0–1.3)
MONOCYTES NFR BLD AUTO: 10.5 %
MUCOUS THREADS #/AREA URNS LPF: PRESENT /LPF
NEUTROPHILS # BLD AUTO: 15.5 10E9/L (ref 1.6–8.3)
NEUTROPHILS NFR BLD AUTO: 81.5 %
NITRATE UR QL: NEGATIVE
NT-PROBNP SERPL-MCNC: 432 PG/ML (ref 0–100)
O2/TOTAL GAS SETTING VFR VENT: NORMAL %
OXYHGB MFR BLDV: 76 %
PCO2 BLDV: 41 MM HG (ref 40–50)
PH BLDV: 7.38 PH (ref 7.32–7.43)
PH UR STRIP: 6 PH (ref 5–7)
PHOSPHATE SERPL-MCNC: 2.5 MG/DL (ref 2.5–5)
PLATELET # BLD AUTO: 383 10E9/L (ref 150–450)
PO2 BLDV: 44 MM HG (ref 25–47)
POTASSIUM SERPL-SCNC: 3.8 MMOL/L (ref 3.5–5.1)
PROCALCITONIN SERPL-MCNC: 1.25 NG/ML
PROT SERPL-MCNC: 6.6 G/DL (ref 6.4–8.9)
RBC # BLD AUTO: 4.3 10E12/L (ref 4.4–5.9)
RBC #/AREA URNS AUTO: 18 /HPF (ref 0–2)
SARS-COV-2 RNA SPEC QL NAA+PROBE: NORMAL
SARS-COV-2 RNA SPEC QL NAA+PROBE: POSITIVE
SODIUM SERPL-SCNC: 139 MMOL/L (ref 134–144)
SOURCE: ABNORMAL
SP GR UR STRIP: >1.035 (ref 1–1.03)
SPECIMEN SOURCE: ABNORMAL
SPECIMEN SOURCE: NORMAL
SPECIMEN SOURCE: NORMAL
SQUAMOUS #/AREA URNS AUTO: 2 /HPF (ref 0–1)
TROPONIN I SERPL-MCNC: 101 PG/ML
TSH SERPL DL<=0.05 MIU/L-ACNC: 1.75 IU/ML (ref 0.34–5.6)
UROBILINOGEN UR STRIP-MCNC: 2 MG/DL (ref 0–2)
WBC # BLD AUTO: 19.1 10E9/L (ref 4–11)
WBC #/AREA URNS AUTO: 18 /HPF (ref 0–5)

## 2020-01-01 PROCEDURE — 96374 THER/PROPH/DIAG INJ IV PUSH: CPT | Performed by: FAMILY MEDICINE

## 2020-01-01 PROCEDURE — 83615 LACTATE (LD) (LDH) ENZYME: CPT | Performed by: FAMILY MEDICINE

## 2020-01-01 PROCEDURE — 25500064 ZZH RX 255 OP 636: Performed by: FAMILY MEDICINE

## 2020-01-01 PROCEDURE — 85652 RBC SED RATE AUTOMATED: CPT | Performed by: FAMILY MEDICINE

## 2020-01-01 PROCEDURE — 81003 URINALYSIS AUTO W/O SCOPE: CPT | Performed by: FAMILY MEDICINE

## 2020-01-01 PROCEDURE — 85025 COMPLETE CBC W/AUTO DIFF WBC: CPT | Performed by: FAMILY MEDICINE

## 2020-01-01 PROCEDURE — 85610 PROTHROMBIN TIME: CPT | Performed by: FAMILY MEDICINE

## 2020-01-01 PROCEDURE — 82728 ASSAY OF FERRITIN: CPT | Mod: GZ | Performed by: FAMILY MEDICINE

## 2020-01-01 PROCEDURE — 84443 ASSAY THYROID STIM HORMONE: CPT | Performed by: FAMILY MEDICINE

## 2020-01-01 PROCEDURE — 99310 SBSQ NF CARE HIGH MDM 45: CPT | Performed by: NURSE PRACTITIONER

## 2020-01-01 PROCEDURE — 25000128 H RX IP 250 OP 636: Performed by: FAMILY MEDICINE

## 2020-01-01 PROCEDURE — 71260 CT THORAX DX C+: CPT

## 2020-01-01 PROCEDURE — G0463 HOSPITAL OUTPT CLINIC VISIT: HCPCS

## 2020-01-01 PROCEDURE — 99213 OFFICE O/P EST LOW 20 MIN: CPT | Performed by: FAMILY MEDICINE

## 2020-01-01 PROCEDURE — 86140 C-REACTIVE PROTEIN: CPT | Performed by: FAMILY MEDICINE

## 2020-01-01 PROCEDURE — 96375 TX/PRO/DX INJ NEW DRUG ADDON: CPT | Mod: XU | Performed by: FAMILY MEDICINE

## 2020-01-01 PROCEDURE — 84145 PROCALCITONIN (PCT): CPT | Performed by: FAMILY MEDICINE

## 2020-01-01 PROCEDURE — 99308 SBSQ NF CARE LOW MDM 20: CPT | Performed by: FAMILY MEDICINE

## 2020-01-01 PROCEDURE — 83605 ASSAY OF LACTIC ACID: CPT | Performed by: FAMILY MEDICINE

## 2020-01-01 PROCEDURE — 87635 SARS-COV-2 COVID-19 AMP PRB: CPT | Performed by: FAMILY MEDICINE

## 2020-01-01 PROCEDURE — 87086 URINE CULTURE/COLONY COUNT: CPT | Performed by: FAMILY MEDICINE

## 2020-01-01 PROCEDURE — 99285 EMERGENCY DEPT VISIT HI MDM: CPT | Mod: Z6 | Performed by: FAMILY MEDICINE

## 2020-01-01 PROCEDURE — C9803 HOPD COVID-19 SPEC COLLECT: HCPCS | Performed by: FAMILY MEDICINE

## 2020-01-01 PROCEDURE — 84484 ASSAY OF TROPONIN QUANT: CPT | Performed by: FAMILY MEDICINE

## 2020-01-01 PROCEDURE — 99285 EMERGENCY DEPT VISIT HI MDM: CPT | Mod: 25 | Performed by: FAMILY MEDICINE

## 2020-01-01 PROCEDURE — 83880 ASSAY OF NATRIURETIC PEPTIDE: CPT | Performed by: FAMILY MEDICINE

## 2020-01-01 PROCEDURE — 84100 ASSAY OF PHOSPHORUS: CPT | Performed by: FAMILY MEDICINE

## 2020-01-01 PROCEDURE — 83735 ASSAY OF MAGNESIUM: CPT | Performed by: FAMILY MEDICINE

## 2020-01-01 PROCEDURE — 93005 ELECTROCARDIOGRAM TRACING: CPT | Performed by: FAMILY MEDICINE

## 2020-01-01 PROCEDURE — 85379 FIBRIN DEGRADATION QUANT: CPT | Performed by: FAMILY MEDICINE

## 2020-01-01 PROCEDURE — 71045 X-RAY EXAM CHEST 1 VIEW: CPT

## 2020-01-01 PROCEDURE — 82805 BLOOD GASES W/O2 SATURATION: CPT | Performed by: FAMILY MEDICINE

## 2020-01-01 PROCEDURE — 85730 THROMBOPLASTIN TIME PARTIAL: CPT | Performed by: FAMILY MEDICINE

## 2020-01-01 PROCEDURE — 93010 ELECTROCARDIOGRAM REPORT: CPT | Performed by: INTERNAL MEDICINE

## 2020-01-01 PROCEDURE — 80053 COMPREHEN METABOLIC PANEL: CPT | Performed by: FAMILY MEDICINE

## 2020-01-01 RX ORDER — PREGABALIN 150 MG/1
150 CAPSULE ORAL 2 TIMES DAILY
Qty: 180 CAPSULE | Refills: 1 | Status: SHIPPED | OUTPATIENT
Start: 2020-01-01 | End: 2020-01-01

## 2020-01-01 RX ORDER — POLYMYXIN B SULFATE AND TRIMETHOPRIM 1; 10000 MG/ML; [USP'U]/ML
1-2 SOLUTION OPHTHALMIC 4 TIMES DAILY
Qty: 2 ML | Refills: 0 | Status: SHIPPED | OUTPATIENT
Start: 2020-01-01 | End: 2020-01-01

## 2020-01-01 RX ORDER — ACETAMINOPHEN 10 MG/ML
1000 INJECTION, SOLUTION INTRAVENOUS ONCE
Status: COMPLETED | OUTPATIENT
Start: 2020-01-01 | End: 2020-01-01

## 2020-01-01 RX ORDER — PREGABALIN 150 MG/1
150 CAPSULE ORAL 2 TIMES DAILY
Qty: 180 CAPSULE | Refills: 1 | Status: SHIPPED | OUTPATIENT
Start: 2020-01-01

## 2020-01-01 RX ORDER — CELECOXIB 100 MG/1
100 CAPSULE ORAL
Qty: 90 CAPSULE | Refills: 3 | Status: SHIPPED | OUTPATIENT
Start: 2020-01-01

## 2020-01-01 RX ORDER — AZITHROMYCIN 500 MG/5ML
500 INJECTION, POWDER, LYOPHILIZED, FOR SOLUTION INTRAVENOUS EVERY 24 HOURS
Status: DISCONTINUED | OUTPATIENT
Start: 2020-01-01 | End: 2020-01-01 | Stop reason: HOSPADM

## 2020-01-01 RX ORDER — IODIXANOL 320 MG/ML
100 INJECTION, SOLUTION INTRAVASCULAR ONCE
Status: COMPLETED | OUTPATIENT
Start: 2020-01-01 | End: 2020-01-01

## 2020-01-01 RX ORDER — DULOXETIN HYDROCHLORIDE 60 MG/1
120 CAPSULE, DELAYED RELEASE ORAL DAILY
Qty: 180 CAPSULE | Refills: 3 | Status: SHIPPED | OUTPATIENT
Start: 2020-01-01

## 2020-01-01 RX ORDER — CEFTRIAXONE SODIUM 2 G/50ML
2 INJECTION, SOLUTION INTRAVENOUS ONCE
Status: DISCONTINUED | OUTPATIENT
Start: 2020-01-01 | End: 2020-01-01 | Stop reason: HOSPADM

## 2020-01-01 RX ADMIN — ACETAMINOPHEN 1000 MG: 10 INJECTION, SOLUTION INTRAVENOUS at 14:04

## 2020-01-01 RX ADMIN — IODIXANOL 100 ML: 320 INJECTION, SOLUTION INTRAVASCULAR at 15:02

## 2020-01-01 ASSESSMENT — ENCOUNTER SYMPTOMS
FEVER: 1
CHILLS: 0
HEMATOLOGIC/LYMPHATIC NEGATIVE: 1
CARDIOVASCULAR NEGATIVE: 1
GASTROINTESTINAL NEGATIVE: 1
APPETITE CHANGE: 1
SHORTNESS OF BREATH: 0
NEUROLOGICAL NEGATIVE: 1
ARTHRALGIAS: 1
FATIGUE: 0
UNEXPECTED WEIGHT CHANGE: 0
DIAPHORESIS: 0
ACTIVITY CHANGE: 0
PSYCHIATRIC NEGATIVE: 1
MYALGIAS: 1
COUGH: 0

## 2020-01-01 ASSESSMENT — PAIN SCALES - GENERAL: PAINLEVEL: NO PAIN (0)
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
## 2020-01-24 NOTE — TELEPHONE ENCOUNTER
States left message earlier today with no response    Reason for call: Medication or medication refill    Name of medication requested: Duloxetine, Lyrica, Celecoxib    Are you out of the medication?  n/a    [facility]    Preferred method for responding to this message: Telephone Call    Phone number patient can be reached at: Other phone number:  [phone]    If we cannot reach you directly, may we leave a detailed response at the number you provided?  n/a

## 2020-01-24 NOTE — TELEPHONE ENCOUNTER
[facility] called sent Rx request for the following:         Last Office Visit:              2020 Nursing Home visit.  Future Office visit:       Nursing home resident          Disp Refills Start End MY   celecoxib (CELEBREX) 100 MG capsule 90 capsule 3 2019  No   Sig - Route: Take 1 capsule (100 mg) by mouth 2 times daily - Oral      Disp Refills Start End MY   DULoxetine (CYMBALTA) 60 MG capsule 180 capsule 3 2019  No   Sig - Route: Take 2 capsules (120 mg) by mouth daily - Oral   Sent to pharmacy as: DULoxetine (CYMBALTA) 60 MG capsule      Disp Refills Start End MY   pregabalin (LYRICA) 150 MG capsule 180 capsule 1 2019  No   Sig - Route: Take 1 capsule (150 mg) by mouth 2 times daily - Oral     -----------------------  Spoke with Kelly (nurse) at [facility] after verifying patients . Nurse states patient is out of his Cymbalta and will run out of the other medications this weekend. Nurse states this medication has been previously filled at [facility]. There still should be refills available but [facility] unless new hard scripts are received by [facility]. Therefore [facility] cant deliver the medications till this is done. eKlly stated it was acceptable for the scripts to be printed and then faxed to [facility]. [facility] will make another PM delivery and she is hoping to get this taken care of before that.  ------------------------------  Routed to primary  Kassie Mcdaniels RN .............. 2020  3:03 PM'

## 2020-01-27 NOTE — TELEPHONE ENCOUNTER
After verifying pts name and date of birth with Kelly at [facility], Kelly notified of message below.  Kinga Bowles LPN

## 2020-02-25 NOTE — TELEPHONE ENCOUNTER
States that called earlier regarding Lyrica. States they need a prior auth done today or pt won't get medication for tomorrow. Would like another provider to addressed if needed.

## 2020-02-25 NOTE — TELEPHONE ENCOUNTER
After verifying pts name and date of birth with Debby at [facility], Debby is needing a refill on pts Lyrica. He currently only has 3 tablets left.  Kinga Bowles LPN
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
## 2020-07-29 NOTE — PROGRESS NOTES
"Nursing Notes:   Swetha Pierre LPN  7/29/2020  2:27 PM  Signed  Patient presents to the clinic today with complaints of left eye pain.  Swetha Pierre LPN 7/29/2020   2:23 PM    Chief Complaint   Patient presents with     Eye Problem       Initial /62 (BP Location: Right arm, Patient Position: Sitting, Cuff Size: Adult Regular)   Pulse 94   Temp 98.3  F (36.8  C) (Tympanic)   Resp 16   Wt 86.1 kg (189 lb 12.8 oz)   SpO2 91%   BMI 28.94 kg/m   Estimated body mass index is 28.94 kg/m  as calculated from the following:    Height as of 11/26/19: 1.725 m (5' 7.91\").    Weight as of this encounter: 86.1 kg (189 lb 12.8 oz).  Medication Reconciliation: complete  Swetha Pierre LPN     SUBJECTIVE:  80 year old male presents with son for bilateral eye pain. He reports redness for the past week.   He rubbed his right eye with hand    Left eye has been red, but unclear reason  He resides at [facility] and nurse recommended he be seen    REVIEW OF SYSTEMS:    Pertinent items are noted in HPI.    Current Outpatient Medications   Medication Sig Dispense Refill     bisacodyl (DULCOLAX) 5 MG EC tablet Take 1 tablet (5 mg) by mouth daily as needed for constipation (As needed for constipation) 90 tablet 1     celecoxib (CELEBREX) 100 MG capsule Take 1 capsule (100 mg) by mouth 2 times daily 90 capsule 3     clonazePAM (KLONOPIN) 0.5 MG tablet Take 1 tablet (0.5 mg) by mouth 2 times daily as needed for anxiety 20 tablet 0     DULoxetine (CYMBALTA) 60 MG capsule Take 2 capsules (120 mg) by mouth daily 180 capsule 3     pantoprazole (PROTONIX) 40 MG EC tablet Take 1 tablet (40 mg) by mouth daily 90 tablet 3     pregabalin (LYRICA) 150 MG capsule Take 1 capsule (150 mg) by mouth 2 times daily 180 capsule 1     Allergies   Allergen Reactions     Aspirin Hives     Nasal congestion     Morphine      Other reaction(s): *Unknown  Nausea, dizzy, sick to stomach     Naproxen      Other reaction(s): " *Unknown  Bothered stomach     Sulfasalazine      Other reaction(s): *Unknown       OBJECTIVE:  /62 (BP Location: Right arm, Patient Position: Sitting, Cuff Size: Adult Regular)   Pulse 94   Temp 98.3  F (36.8  C) (Tympanic)   Resp 16   Wt 86.1 kg (189 lb 12.8 oz)   SpO2 91%   BMI 28.94 kg/m      EXAM:  General Appearance: Alert. No acute distress  Eye: Left medial lower eyelid with hordeolum. Slight conjunctival irritation medially.  Psychiatric: Normal affect and mentation      ASSESSMENT/PLAN:    ICD-10-CM    1. Hordeolum externum left lower eyelid  H00.015 trimethoprim-polymyxin b (POLYTRIM) 47280-5.1 UNIT/ML-% ophthalmic solution     Son reports patient will not apply warm compresses to his eye 4 times daily.  They can try to see if staff at [facility] will help.  Based on duration over 1 week, tried treating with antibiotic drops to see if this helps.  Use Polytrim drops 4 times daily.  If not improving, he may require excision for a chalazion.    Follow-up as needed     Bienvenido Moses MD    This document was prepared using a combination of typing and voice generated software.  While every attempt was made for accuracy, spelling and grammatical errors may exist.

## 2020-07-29 NOTE — PATIENT INSTRUCTIONS
Use eye drops 4 times daily for 5 days to help resolve the stye  Helps to also warm pack over the eye

## 2020-07-29 NOTE — NURSING NOTE
"Patient presents to the clinic today with complaints of left eye pain.  Swetha Pierre LPN 7/29/2020   2:23 PM    Chief Complaint   Patient presents with     Eye Problem       Initial /62 (BP Location: Right arm, Patient Position: Sitting, Cuff Size: Adult Regular)   Pulse 94   Temp 98.3  F (36.8  C) (Tympanic)   Resp 16   Wt 86.1 kg (189 lb 12.8 oz)   SpO2 91%   BMI 28.94 kg/m   Estimated body mass index is 28.94 kg/m  as calculated from the following:    Height as of 11/26/19: 1.725 m (5' 7.91\").    Weight as of this encounter: 86.1 kg (189 lb 12.8 oz).  Medication Reconciliation: complete  Swetha Pierre LPN    "

## 2020-09-10 NOTE — ED NOTES
Provider is at bedside.  Family is planning on [facility]osp[facility] at this time.   Kelley Moser RN on 9/10/2020 at 3:43 PM

## 2020-09-10 NOTE — PROGRESS NOTES
1.  Has the patient had a previous reaction to IV contrast? no    2.  Does the patient have kidney disease? no    3.  Is the patient on dialysis? no    If YES to any of these questions, exam will be reviewed with a Radiologist before administering contrast.    IV Contrast- Discharge Instructions After Your CT Scan      The IV contrast you received today will be filtered from your bloodstream by your kidneys during the next 24 hours and pass from the body in urine.  You will not be aware of this process and your urine will not change in color.  To help this process you should drink at least 4 additional glasses of water or juice today.  This reduces stress on your kidneys.    Most contrast reactions are immediate.  Should you develop symptoms of concern after discharge, contact the department at the number below.  After hours you should contact your personal physician.  If you develop breathing distress or wheezing, call 911.
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
## 2020-09-10 NOTE — PROGRESS NOTES
Patient hs profound hearing loss.  He comes from [facility].  Patient's oxygen level dropped to the high 70's on RA when his shirt was removed and he was changed into a hospital gown.  Patient is sleeping at this time.

## 2020-09-10 NOTE — ED NOTES
Patient boosted up in bed.  He states that he wants to go to [facility] if he needs to be transferred.  Daughter notified of positive COVID results.    Kelley Moser RN on 9/10/2020 at 2:06 PM

## 2020-09-10 NOTE — ED NOTES
Patient is off the floor in CT.  Son is now present at bedside.    Kelley Moser RN on 9/10/2020 at 3:19 PM

## 2020-09-10 NOTE — ED TRIAGE NOTES
EMS reports patient caretakers took patient 02 and it was 78%.  Also patient stated to care taker that he lost his luis antonio and sense of smell yesterday. Temp at facility 99.6. upon arrival patient 02 on room air 83%, on 4 liters via NC 91%.

## 2020-09-10 NOTE — PROGRESS NOTES
Had conversation with patient.  He states that he feels like he is going to die from COVID and wants to die at home.  Stated that he would prefer not to be transferred if possible.  Patient requested that his son Kory come to visit him.  Patient said that Kory helps him to make all of his important decisions.  Patient is profoundly deaf and this writer has been communicating with him via white board.      Called House Supervisor.  Son is allowed to come into as long as he has the proper PPE on.      Kory is ok, with wearing full PPE.  Awaiting his arrival.    Kelley Moser RN on 9/10/2020 at 2:48 PM

## 2020-09-10 NOTE — ED NOTES
Called family contacts.  Spoke with daughter.  They were not notified that patient was brought to the ER.  Daughter updated.    Kelley Moser RN on 9/10/2020 at 1:18 PM

## 2020-09-10 NOTE — ED NOTES
Called Altagracia at [facility]. I stated that per son, he was informed that patient can not come back to his home at [facility].  Altagracia informed me that they do not provide services there.  I informed Altagracia that at this time, patient will not need any services.  Altagracia did not give me any additional information and requested that Kory call her back.    Kelley Moser RN on 9/10/2020 at 3:24 PM

## 2020-09-10 NOTE — PROGRESS NOTES
:      was updated by hospitalist that patient would like to return home at [facility] with hospice and they were told he could not return to his home.    Contacted [facility] and spoke with nurse Daniels to discuss discharge planning.  Discussed options for patient to return home with hospice services if patient's family is able to provide daily cares.     Contacted [facility] to see if they are accepting patient's that are COVID positive and they do.    Coordination with [facility] and [facility] to see if they would accept a COVID positive patient for comfort care/end of life.  Awaiting responses.       Spoke with ED nursing staff and house supervisor.  Son is here.  Primary nurse in with patient.  Will discuss further discharge planning based on what family/patient would like to do.      JULIO Nevarez on 9/10/2020 at 3:38 PM    Addendum:     Per Eleanor in admissions at [facility] they cannot accept new admissions that are COVID positive.    JULIO Nevarez on 9/10/2020 at 3:57 PM    Addendum:     Received a response from [facility] and they are not accepting new admissions that are COVID positive.      Spoke with Kelley GARCIA and updated on  planning.  She updated that family did not think they can care for patient at home with hospice.  Anticipate patient will possibly transfer.     JULIO Nevarez on 9/10/2020 at 4:48 PM

## 2020-09-12 NOTE — RESULT ENCOUNTER NOTE
Final urine culture report is NEGATIVE per [facility] Result protocol.    If NEGATIVE result, no change in treatment, per [facility] Result protocol.

## 2020-09-22 NOTE — TELEPHONE ENCOUNTER
After verifying pts name and date of birth with Eva, Eva is needing a verbal okay from Dr. Moses for pt to be admitted to Hospice, and allowing their provider to manage care.  Kinga Bowles LPN

## (undated) RX ORDER — SODIUM CHLORIDE 9 MG/ML
INJECTION, SOLUTION INTRAVENOUS
Status: DISPENSED
Start: 2020-01-01

## (undated) RX ORDER — ACETAMINOPHEN 10 MG/ML
INJECTION, SOLUTION INTRAVENOUS
Status: DISPENSED
Start: 2020-01-01

## (undated) RX ORDER — AZITHROMYCIN 500 MG/5ML
INJECTION, POWDER, LYOPHILIZED, FOR SOLUTION INTRAVENOUS
Status: DISPENSED
Start: 2020-01-01